# Patient Record
Sex: MALE | Race: WHITE | Employment: OTHER | URBAN - METROPOLITAN AREA
[De-identification: names, ages, dates, MRNs, and addresses within clinical notes are randomized per-mention and may not be internally consistent; named-entity substitution may affect disease eponyms.]

---

## 2017-01-11 ENCOUNTER — OFFICE VISIT (OUTPATIENT)
Dept: HEMATOLOGY | Age: 62
End: 2017-01-11

## 2017-01-11 VITALS
RESPIRATION RATE: 14 BRPM | SYSTOLIC BLOOD PRESSURE: 140 MMHG | TEMPERATURE: 97.6 F | BODY MASS INDEX: 31.33 KG/M2 | HEIGHT: 75 IN | OXYGEN SATURATION: 98 % | HEART RATE: 68 BPM | DIASTOLIC BLOOD PRESSURE: 69 MMHG | WEIGHT: 252 LBS

## 2017-01-11 DIAGNOSIS — K70.30 ALCOHOLIC CIRRHOSIS OF LIVER WITHOUT ASCITES (HCC): Primary | ICD-10-CM

## 2017-01-11 NOTE — PROGRESS NOTES
1. Have you been to the ER, urgent care clinic since your last visit? Hospitalized since your last visit? No    2. Have you seen or consulted any other health care providers outside of the [address] [place] [address] since your last visit? Include any pap smears or colon screening.  No     Chief Complaint   Patient presents with    Follow-up     Fasting

## 2017-01-11 NOTE — PROGRESS NOTES
[address], MD, FACP, [facility]     April Jennifer Hickey, ROSI Rodriguez MD, [address] [facility], Garland Parson MD     [address] [place], AWAIS Lee NP        [address] [place] [address]     [address] [facility], [address] Stephanie Lucas  22.     [phone]     FAX: [address] [place] [address] Drive, [address] [facility], [address]     [phone]     FAX: [phone]       Patient Care Team:  Ermias Booth MD as PCP - General (General Practice)  Ermias Booth MD (General Practice)      Problem List  Date Reviewed: 9/23/2016          Codes Class Noted    Ascites ICD-10-CM: R18.8  ICD-9-CM: 789.59  12/26/2015        S/P TIPS (transjugular intrahepatic portosystemic shunt) ICD-10-CM: M04.685  ICD-9-CM: V45.89  12/26/2015        Cirrhosis, alcoholic ([facility] [id number]: K70.30  ICD-9-CM: 571.2  10/6/2015        Hyponatremia ICD-10-CM: E87.1  ICD-9-CM: 276.1  9/24/2015            Laina Ballesteros returns to the [address] for management of 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 The active problem list, all pertinent past medical history, medications, endoscopic studies, radiologic findings and laboratory findings related to the liver disorder were reviewed with the patient. The patient is a 62 y.o.  male who was first found to have cirrhosis in 9/2015 when developed ascites. Ascites became refractory to diuretics because of hyponatremia. The patient underwent TIPS for treatment of refactory ascites in 12/2015. He has done extremely since placement of the TIPS. Edema has resolved with diuretics. He is currently on furosemide 40 mg only with K-Dur supplements. Patient had formed tender gynecomastia and we had discontinued use of spironolactone at his last office visit.   He states that his symptoms have resolved and he believes that he has had better energy since discontinuation of spironolactone. He questions if he can decrease dose of furosemide as he thinks this may be associated with leg pain/cramping at night. The patient has not developed hepatic encephalopathy post-TIPS. He has had some difficulty with sleep initiation, this is improved with prn use of trazodone. The patient has esophageal varices without bleeding. Varices have been treated with band ligation and now TIPS. The patient has had some recent weight gain in the last 5-6 months and attributes this to inactivity. He is watching diet carefully. He does not believe that he is having fluid retention. The patient completes all daily activities without any functional limitations. The patient has not experienced fatigue, fevers, chills, pain in the right side over the liver, problems concentrating, swelling of the abdomen, swelling of the lower extremities, hematemesis, hematochezia. ALLERGIES  Allergies   Allergen Reactions    Erythromycin Other (comments)     Headache, n/v, diarrhea      Azithromycin Diarrhea and Palpitations    Lipitor [Atorvastatin] Cough    Pravastatin Cough       MEDICATIONS  Current Outpatient Prescriptions   Medication Sig    traZODone (DESYREL) 50 mg tablet TAKE 1 TABLET BY MOUTH EVERY EVENING    KLOR-CON 10 10 mEq tablet TAKE 1 TABLET BY MOUTH ONCE DAILY    metoprolol tartrate (LOPRESSOR) 25 mg tablet Take  by mouth two (2) times a day.  furosemide (LASIX) 40 mg tablet TAKE 1 TABLET BY MOUTH EVERY DAY    losartan (COZAAR) 50 mg tablet     omeprazole (PRILOSEC) 40 mg capsule Take 1 Cap by mouth daily. Indications: GASTRIC ULCER    acetaminophen (TYLENOL) 325 mg tablet Take 325-650 mg by mouth every four (4) hours as needed for Pain.  therapeutic multivitamin (THERAGRAN) tablet Take 1 Tab by mouth daily.  spironolactone (ALDACTONE) 100 mg tablet TAKE 1 TABLET BY MOUTH DAILY    multivitamin (ONE A DAY) tablet Take 1 Tab by mouth daily.  furosemide (LASIX) 40 mg tablet Take 40 mg by mouth daily.  spironolactone (ALDACTONE) 100 mg tablet Take 100 mg by mouth daily.  oxyCODONE IR (ROXICODONE) 5 mg immediate release tablet Take 1 Tab by mouth every four (4) hours as needed for Pain. Max Daily Amount: 30 mg.    oxyCODONE IR (ROXICODONE) 5 mg immediate release tablet Take 1 Tab by mouth every four (4) hours as needed for Pain. Max Daily Amount: 30 mg.    spironolactone (ALDACTONE) 100 mg tablet Take 1 Tab by mouth daily. No current facility-administered medications for this visit. SYSTEM REVIEW NOT RELATED TO LIVER DISEASE OR REVIEWED ABOVE:  Constitution systems: Negative for fever, chills, weight loss. Weight gain of 27# over the course of the last 6 months. Eyes: Negative for visual changes. ENT: Negative for sore throat, painful swallowing. Respiratory: Negative for cough, hemoptysis, SOB. Cardiology: Negative for chest pain, palpitations. GI:  Negative for constipation or diarrhea. : Negative for urinary frequency, dysuria, hematuria, nocturia. Skin: Negative for rash. Well-healed umbilical scar. Hematology: Negative for easy bruising, blood clots. Musculo-skeletal: Negative for back pain, muscle pain, weakness. Neurologic: Negative for headaches, dizziness, vertigo, memory problems not related to HE. Psychology: Negative for anxiety, depression. FAMILY HISTORY:  The father  of head and neck CA. The mother  of brain cancer. There is no family history of liver disease. SOCIAL HISTORY:  The patient is . The patient has 1 child and 1 grandchild. The patient has never used tobacco products. The patient previously consumed 4-6 alcoholic beverages per day. He states that he has had no alcohol since late 2015. The patient used to work as an  for [employer] at [employer]. The patient retired in 2015.      PHYSICAL EXAMINATION:  Visit Vitals    /69 (BP 1 Location: Left arm, BP Patient Position: At rest)    Pulse 68    Temp 97.6 °F (36.4 °C) (Oral)    Resp 14    Ht 6' 3\" (1.905 m)    Wt 252 lb (114.3 kg)    SpO2 98%    BMI 31.5 kg/m2     General: No acute distress. Eyes: Sclera anicteric. ENT: No oral lesions. Thyroid normal.  Nodes: No adenopathy. Skin: No spider angiomata. No jaundice. No palmar erythema. Respiratory: Lungs clear to auscultation. Cardiovascular: Regular heart rate. Grade I/VI holosystolic murmur. No JVD. Some minimal nipple enlargement. Abdomen: Soft non-tender. Liver palpable 2-3 cm BCM. No obvious ascites. Well-healed umbilical hernia scar. Extremities: No edema. No muscle wasting. No gross arthritic changes. Neurologic: Alert and oriented. Cranial nerves grossly intact. No asterixis. LABORATORY STUDIES:  [facility] of [address] [place] [address] & Units 9/23/2016 5/5/2016   WBC 3.4 - 10.8 x10E3/uL 4.9 5.2   ANC 1.8 - 8.0 K/UL     HGB 12.6 - 17.7 g/dL 14.5 12.9    - 379 x10E3/uL 146 (L) 116 (L)   INR 0.8 - 1.2 1.3 (H) 1.3 (H)   AST 0 - 40 IU/L 44 (H) 53 (H)   ALT 0 - 44 IU/L 30 21   Alk Phos 39 - 117 IU/L 128 (H) 103   Bili, Total 0.0 - 1.2 mg/dL 1.2 1.4 (H)   Bili, Direct 0.00 - 0.40 mg/dL 0.40 0.49 (H)   Albumin 3.6 - 4.8 g/dL 3.9 3.8   BUN 8 - 27 mg/dL 9 9   Creat 0.76 - 1.27 mg/dL 0.66 (L) 0.68 (L)   Na 134 - 144 mmol/L 134 132 (L)   K 3.5 - 5.2 mmol/L 4.3 4.6   Cl 97 - 108 mmol/L 96 (L) 94 (L)   CO2 18 - 29 mmol/L 23 25   Glucose 65 - 99 mg/dL 116 (H) 96   Cancer Screening Latest Ref Rng & Units 9/23/2016 5/5/2016   AFP, Serum 0.0 - 8.0 ng/mL 3.3 4.1   AFP-L3% 0.0 - 9.9 % 10.5 (H) 12.2 (H)   Additional lab values drawn at today's office visit are pending at the time of documentation.     SEROLOGIES:  Serologies Latest Ref Rng 9/25/2015   Hep A Ab, Total NEGATIVE   NEGATIVE   Hep B Core Ab, Total NEGATIVE   NEGATIVE   Ferritin 26 - 388 NG/ (H)   Iron % Saturation 20 - 50 % 27   Alpha-1 antitrypsin level 90 - 200 mg/dL 165   9/2015. HBsurface antigen negative, Anti-HBsurface negative, anti-HCV negative. LIVER HISTOLOGY:  Not available or performed    ENDOSCOPIC PROCEDURES:  9/2015. EGD performed by NAE. Medium esophageal varices. Banding performed. Mild portal gastropathy. RADIOLOGY:  9/2015. Ultrasound of liver. Echogenic consistent with cirrhosis. No liver mass lesions. No dilated bile ducts. Severe ascites. 1/2016. CT scan abdomen with and without IV contrast.  Changes consistent with cirrhosis. No liver mass lesions. No dilated bile ducts. Small ascites. 5/2016. Ultrasound of liver. Echogenic consistent with cirrhosis. No liver mass lesions. No dilated bile ducts. No ascites. TIPS patent. 11/2016. Ultrasound of liver. Patent TIPS. No significant ascites. Heterogeneous liver. OTHER TESTING:  Not available or performed    ASSESSMENT AND PLAN:  Cirrhosis secondary to alcohol. Recent labs show stable, depressed liver function. His last Na MELD was calculated at 13, this will now be repeated. As patient is now >12 months sober, consideration for liver transplantation could be entertained if indicated. On the basis of the last labs in 9/2016, patient had shown some marked improvement in labs and we had chosen to defer further consideration at that time. I will recalculate his MELD and discuss this further with the patient. He would need to establish with counselor or alcohol rehabilitation program in order to pursue liver transplant evaluation. Gynecomastia. Improved with discontinuation of spironolactone. Will continue with furosemide only at this time, I have advised patient ok to decrease dose of furosemide to 20 mg daily as he think that some of his leg cramping is associated with diuretic use. He has had significant weight gain, although on exam, has no LE edema and no perceived ascites.       Ascites has now resolved following placement of TIPS and with diuretic. Will continue with decreased dose of diuretics, to furosemide 20 mg only. Will continue to monitor sodium levels as hyponatremia has been a long-standing issue. Recent weight gain. Patient will work of diet and exercise. I will continue to monitor for patency of TIPS with US evaluation and this has been ordered for return office visit in 5/2017. Lower extremity edema has resolved with current dose of diuretics. Esophageal varices without prior bleeding. Varices have been banded. TIPS will treat varcies he will not require additional EGD with banding. Follow-up per Dr. Denzel Nathan. Hepatic encephalopathy has not developed to date. There is no need for treatment with lactulose and/or Xifaxan at this time. No need to restrict dietary protein at this time. Insomnia. Patient notes improvement in symptoms with the addition of trazadone, will continue to use prn. The patient was directed to continue all current medications at the current dosages. There are no contraindications for the patient to take any medications that are necessary for treatment of other medical issues. The patient was counseled regarding alcohol consumption. I have congratulated him on attaining >18 months sobriety. Vaccination for viral hepatitis A and B is recommended since the patient has no serologic evidence of previous exposure or vaccination with immunity. [address] screening has recently been performed and does not suggest [address] by recent CT scan. The next liver imaging study will be performed in 5/2017 for US with doppler study. All of the above issues were discussed with the patient. All questions were answered. The patient expressed a clear understanding of the above. [facility] in 3-4 months, with duplex US of the liver at that time.     Jose French PA-C  Liver [place] of [address] [facility] [address] [address]  [phone]

## 2017-01-12 LAB
AFP L3 MFR SERPL: NORMAL % (ref 0–9.9)
AFP SERPL-MCNC: 2.4 NG/ML (ref 0–8)
ALBUMIN SERPL-MCNC: 4 G/DL (ref 3.6–4.8)
ALP SERPL-CCNC: 112 IU/L (ref 39–117)
ALT SERPL-CCNC: 33 IU/L (ref 0–44)
AST SERPL-CCNC: 42 IU/L (ref 0–40)
BILIRUB DIRECT SERPL-MCNC: 0.39 MG/DL (ref 0–0.4)
BILIRUB SERPL-MCNC: 1 MG/DL (ref 0–1.2)
BUN SERPL-MCNC: 7 MG/DL (ref 8–27)
BUN/CREAT SERPL: 11 (ref 10–22)
CALCIUM SERPL-MCNC: 9.1 MG/DL (ref 8.6–10.2)
CHLORIDE SERPL-SCNC: 96 MMOL/L (ref 96–106)
CO2 SERPL-SCNC: 22 MMOL/L (ref 18–29)
CREAT SERPL-MCNC: 0.66 MG/DL (ref 0.76–1.27)
ERYTHROCYTE [DISTWIDTH] IN BLOOD BY AUTOMATED COUNT: 13.2 % (ref 12.3–15.4)
GLUCOSE SERPL-MCNC: 100 MG/DL (ref 65–99)
HCT VFR BLD AUTO: 41.7 % (ref 37.5–51)
HGB BLD-MCNC: 15.4 G/DL (ref 12.6–17.7)
INR PPP: 1.3 (ref 0.8–1.2)
MCH RBC QN AUTO: 33.8 PG (ref 26.6–33)
MCHC RBC AUTO-ENTMCNC: 36.9 G/DL (ref 31.5–35.7)
MCV RBC AUTO: 91 FL (ref 79–97)
MORPHOLOGY BLD-IMP: ABNORMAL
PLATELET # BLD AUTO: 156 X10E3/UL (ref 150–379)
POTASSIUM SERPL-SCNC: 4.8 MMOL/L (ref 3.5–5.2)
PROTHROMBIN TIME: 12.9 SEC (ref 9.1–12)
RBC # BLD AUTO: 4.56 X10E6/UL (ref 4.14–5.8)
SODIUM SERPL-SCNC: 132 MMOL/L (ref 134–144)
WBC # BLD AUTO: 5.6 X10E3/UL (ref 3.4–10.8)

## 2017-04-24 RX ORDER — TRAZODONE HYDROCHLORIDE 50 MG/1
TABLET ORAL
Qty: 30 TAB | Refills: 3 | Status: SHIPPED | OUTPATIENT
Start: 2017-04-24 | End: 2018-03-08

## 2017-05-08 ENCOUNTER — HOSPITAL ENCOUNTER (OUTPATIENT)
Dept: ULTRASOUND IMAGING | Age: 62
Discharge: HOME | End: 2017-05-08
Attending: PHYSICIAN ASSISTANT
Payer: COMMERCIAL

## 2017-05-08 DIAGNOSIS — K70.30 ALCOHOLIC CIRRHOSIS OF LIVER WITHOUT ASCITES (HCC): ICD-10-CM

## 2017-05-08 PROCEDURE — 93975 VASCULAR STUDY: CPT

## 2017-05-11 ENCOUNTER — OFFICE VISIT (OUTPATIENT)
Dept: HEMATOLOGY | Age: 62
End: 2017-05-11

## 2017-05-11 VITALS
DIASTOLIC BLOOD PRESSURE: 95 MMHG | SYSTOLIC BLOOD PRESSURE: 167 MMHG | OXYGEN SATURATION: 96 % | BODY MASS INDEX: 31.27 KG/M2 | TEMPERATURE: 98.9 F | HEART RATE: 66 BPM | WEIGHT: 250.2 LBS

## 2017-05-11 DIAGNOSIS — K70.30 ALCOHOLIC CIRRHOSIS OF LIVER WITHOUT ASCITES (HCC): Primary | ICD-10-CM

## 2017-05-11 NOTE — PROGRESS NOTES
[address] [facility], MD, FACP, [facility]     April Jenni Suárez, ROSI Grant MD, [address], Bertha Matt MD     [address] [place], NP     Xochitl Camacho, AWAIS [facility]     [address] [facility], [address] Stephanie Lucas  22.     [phone]     FAX: [address] [place] [address] Drive, [address] [facility], [address]     [phone]     FAX: [phone]       Patient Care Team:  Claribel Salazar MD as PCP - General (General Practice)  Claribel Salazar MD (General Practice)      Problem List  Date Reviewed: 1/11/2017          Codes Class Noted    Ascites ICD-10-CM: R18.8  ICD-9-CM: 789.59  12/26/2015        S/P TIPS (transjugular intrahepatic portosystemic shunt) ICD-10-CM: Q19.632  ICD-9-CM: V45.89  12/26/2015        Cirrhosis, alcoholic ([facility] [id number]: K70.30  ICD-9-CM: 571.2  10/6/2015        Hyponatremia ICD-10-CM: E87.1  ICD-9-CM: 276.1  9/24/2015                 Kamryn Le returns to the [place] [address] of [address] [place] [address] for management of 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 The active problem list, all pertinent past medical history, medications, endoscopic studies, radiologic findings and laboratory findings related to the liver disorder were reviewed with the patient. The patient is a 65 y.o.  male who was first found to have cirrhosis in 9/2015 when developed ascites. Ascites became refractory to diuretics because of hyponatremia. The patient underwent TIPS for treatment of refactory ascites in 12/2015. He has done extremely since placement of the TIPS. Edema has resolved with diuretics. Patient has done so well with resolution of edema that he discontinued use of all diuretics as of ~3 weeks ago. He has noted no increase in fluid and has had an improvement in muscle cramping symptoms and subsequent improved sleeping patterns.   He continues with with prn use of trazodone. The patient has esophageal varices without bleeding. Varices have been treated with band ligation and now TIPS. The patient has had some recent weight gain in the last 5-6 months and attributes this to inactivity. He is watching diet carefully. He does not believe that he is having fluid retention. The patient completes all daily activities without any functional limitations. The patient has not experienced fatigue, fevers, chills, pain in the right side over the liver, problems concentrating, swelling of the abdomen, swelling of the lower extremities, hematemesis, hematochezia. ALLERGIES  Allergies   Allergen Reactions    Erythromycin Other (comments)     Headache, n/v, diarrhea      Azithromycin Diarrhea and Palpitations    Lipitor [Atorvastatin] Cough    Pravastatin Cough       MEDICATIONS  Current Outpatient Prescriptions   Medication Sig    traZODone (DESYREL) 50 mg tablet TAKE 1 TABLET BY MOUTH EVERY EVENING    KLOR-CON 10 10 mEq tablet TAKE 1 TABLET BY MOUTH ONCE DAILY    metoprolol tartrate (LOPRESSOR) 25 mg tablet Take  by mouth two (2) times a day.  furosemide (LASIX) 40 mg tablet TAKE 1 TABLET BY MOUTH EVERY DAY    losartan (COZAAR) 50 mg tablet     omeprazole (PRILOSEC) 40 mg capsule Take 1 Cap by mouth daily. Indications: GASTRIC ULCER    multivitamin (ONE A DAY) tablet Take 1 Tab by mouth daily.  furosemide (LASIX) 40 mg tablet Take 40 mg by mouth daily.  oxyCODONE IR (ROXICODONE) 5 mg immediate release tablet Take 1 Tab by mouth every four (4) hours as needed for Pain. Max Daily Amount: 30 mg.    acetaminophen (TYLENOL) 325 mg tablet Take 325-650 mg by mouth every four (4) hours as needed for Pain.  therapeutic multivitamin (THERAGRAN) tablet Take 1 Tab by mouth daily. No current facility-administered medications for this visit.         SYSTEM REVIEW NOT RELATED TO LIVER DISEASE OR REVIEWED ABOVE:  Constitution systems: Negative for fever, chills, weight loss. Weight gain of 27# over the course of the last 6 months, stable. Eyes: Negative for visual changes. ENT: Negative for sore throat, painful swallowing. Respiratory: Negative for cough, hemoptysis, SOB. Cardiology: Negative for chest pain, palpitations. GI:  Negative for constipation or diarrhea. : Negative for urinary frequency, dysuria, hematuria, nocturia. Skin: Negative for rash. Well-healed umbilical scar. Hematology: Negative for easy bruising, blood clots. Musculo-skeletal: Negative for back pain, muscle pain, weakness. Neurologic: Negative for headaches, dizziness, vertigo, memory problems not related to HE. Psychology: Negative for anxiety, depression. FAMILY HISTORY:  The father  of head and neck CA. The mother  of brain cancer. There is no family history of liver disease. SOCIAL HISTORY:  The patient is . The patient has 1 child and 1 grandchild. The patient has never used tobacco products. The patient previously consumed 4-6 alcoholic beverages per day. He states that he has had no alcohol since late 2015. The patient used to work as an  for [employer] at [employer]. The patient retired in 2015. PHYSICAL EXAMINATION:  Visit Vitals    BP (!) 167/95    Pulse 66    Temp 98.9 °F (37.2 °C) (Tympanic)    Wt 250 lb 3.2 oz (113.5 kg)    SpO2 96%    BMI 31.27 kg/m2     General: No acute distress. Eyes: Sclera anicteric. ENT: No oral lesions. Thyroid normal.  Nodes: No adenopathy. Skin: No spider angiomata. No jaundice. No palmar erythema. Respiratory: Lungs clear to auscultation. Cardiovascular: Regular heart rate. Grade I/VI holosystolic murmur. No JVD. Some minimal nipple enlargement. Abdomen: Soft non-tender. Liver palpable 2-3 cm BCM. No obvious ascites. Well-healed umbilical hernia scar. Extremities: No edema. No muscle wasting.   No gross arthritic changes. Neurologic: Alert and oriented. Cranial nerves grossly intact. No asterixis. LABORATORY STUDIES:  Liver [place] of [address] & Units 1/11/2017 9/23/2016 5/5/2016   WBC 3.4 - 10.8 x10E3/uL 5.6 4.9 5.2   ANC 1.8 - 8.0 K/UL      HGB 12.6 - 17.7 g/dL 15.4 14.5 12.9    - 379 x10E3/uL 156 146 (L) 116 (L)   INR 0.8 - 1.2 1.3 (H) 1.3 (H) 1.3 (H)   AST 0 - 40 IU/L 42 (H) 44 (H) 53 (H)   ALT 0 - 44 IU/L 33 30 21   Alk Phos 39 - 117 IU/L 112 128 (H) 103   Bili, Total 0.0 - 1.2 mg/dL 1.0 1.2 1.4 (H)   Bili, Direct 0.00 - 0.40 mg/dL 0.39 0.40 0.49 (H)   Albumin 3.6 - 4.8 g/dL 4.0 3.9 3.8   BUN 8 - 27 mg/dL 7 (L) 9 9   Creat 0.76 - 1.27 mg/dL 0.66 (L) 0.66 (L) 0.68 (L)   Na 134 - 144 mmol/L 132 (L) 134 132 (L)   K 3.5 - 5.2 mmol/L 4.8 4.3 4.6   Cl 96 - 106 mmol/L 96 96 (L) 94 (L)   CO2 18 - 29 mmol/L 22 23 25   Glucose 65 - 99 mg/dL 100 (H) 116 (H) 96     Cancer Screening Latest Ref Rng & Units 1/11/2017 9/23/2016 5/5/2016   AFP, Serum 0.0 - 8.0 ng/mL 2.4 3.3 4.1   AFP-L3% 0.0 - 9.9 % Comment 10.5 (H) 12.2 (H)   Additional lab values drawn at today's office visit are pending at the time of documentation. SEROLOGIES:  Serologies Latest Ref Rng 9/25/2015   Hep A Ab, Total NEGATIVE   NEGATIVE   Hep B Core Ab, Total NEGATIVE   NEGATIVE   Ferritin 26 - 388 NG/ (H)   Iron % Saturation 20 - 50 % 27   Alpha-1 antitrypsin level 90 - 200 mg/dL 165   9/2015. HBsurface antigen negative, Anti-HBsurface negative, anti-HCV negative. LIVER HISTOLOGY:  Not available or performed    ENDOSCOPIC PROCEDURES:  9/2015. EGD performed by MLS. Medium esophageal varices. Banding performed. Mild portal gastropathy. RADIOLOGY:  9/2015. Ultrasound of liver. Echogenic consistent with cirrhosis. No liver mass lesions. No dilated bile ducts. Severe ascites. 1/2016. CT scan abdomen with and without IV contrast.  Changes consistent with cirrhosis. No liver mass lesions. No dilated bile ducts. Small ascites. 5/2016. Ultrasound of liver. Echogenic consistent with cirrhosis. No liver mass lesions. No dilated bile ducts. No ascites. TIPS patent. 11/2016. Ultrasound of liver. Patent TIPS. No significant ascites. Heterogeneous liver.  5/2017. Ultrasound of liver. Echogenic consistent with cirrhosis. Patent TIPS, no liver mass lesions. No dilated bile ducts. No ascites. OTHER TESTING:  Not available or performed    ASSESSMENT AND PLAN:  Cirrhosis secondary to alcohol. Recent labs show stable liver function dramatically improved since cessation of alcohol. He feels that he is back to his baseline status for strength and stamina. His last Na MELD was calculated at 11, this will now be repeated. As patient is now >12 months sober, consideration for liver transplantation could be entertained if indicated. On the basis of the last labs in 1/2017, patient had shown some marked improvement and there is no indication for transplant consideration at this time. Ascites and edema have now resolved following placement of TIPS. Recent assessment of TIPS is patent. Patient has discontinued use of diuretics as of ~3 weeks ago and has had no reaccumulation. I will continue to monitor. Lower extremity edema has resolved. Esophageal varices without prior bleeding. Varices have been banded. TIPS will treat varcies he will not require additional EGD with banding. Follow-up per Dr. Jude Jacinto. Hepatic encephalopathy has not developed to date. There is no need for treatment with lactulose and/or Xifaxan at this time. No need to restrict dietary protein at this time. Insomnia. Patient notes improvement in symptoms with the addition of trazadone, will continue to use prn. The patient was directed to continue all current medications at the current dosages. There are no contraindications for the patient to take any medications that are necessary for treatment of other medical issues. The patient was counseled regarding alcohol consumption. I have congratulated him on attaining >18 months sobriety. Vaccination for viral hepatitis A and B is recommended since the patient has no serologic evidence of previous exposure or vaccination with immunity. [address] screening has recently been performed and does not suggest [address] The next liver imaging study will be performed in 11/2017 for US with doppler study. All of the above issues were discussed with the patient. All questions were answered. The patient expressed a clear understanding of the above. Alec in 3-4 months.     Lorraine Fontanez PA-C  Liver [place] of [address] [place] [address], [address] [address] [place]  22. [phone]

## 2017-05-11 NOTE — MR AVS SNAPSHOT
Visit Information Date & Time Provider Department Dept. Phone Encounter #  
 5/11/2017 11:00 AM [facility] [address] [id number] Follow-up Instructions Return in about 3 months (around 8/11/2017) for Jenny and Margaret [address] Upcoming Health Maintenance Date Due DTaP/Tdap/Td series (1 - Tdap) 2/16/1977 ZOSTER VACCINE AGE 60> 2/16/2016 FOBT Q 1 YEAR AGE 50-75 1/19/2017 INFLUENZA AGE 9 TO ADULT 8/1/2017 Allergies as of 5/11/2017  Review Complete On: 5/11/2017 By: Ilia Davis Severity Noted Reaction Type Reactions Erythromycin High 09/24/2015   Side Effect Other (comments) Headache, n/v, diarrhea Azithromycin  01/19/2016    Diarrhea, Palpitations Lipitor [Atorvastatin]  09/24/2015    Cough Pravastatin  09/24/2015    Cough Current Immunizations  Reviewed on 1/20/2016 Name Date Hep A Vaccine 10/8/2015 Hep B Vaccine 11/8/2015 Influenza Vaccine (Quad) PF 9/28/2015  1:14 PM  
 Pneumococcal Polysaccharide (PPSV-23) 9/28/2015  1:15 PM  
  
 Not reviewed this visit You Were Diagnosed With   
  
 Codes Comments Alcoholic cirrhosis of liver without ascites ([facility].)    -  Primary ICD-10-CM: K70.30 ICD-9-CM: 571.2 Vitals BP Pulse Temp Weight(growth percentile) SpO2 BMI  
 (!) 167/95 66 98.9 °F (37.2 °C) (Tympanic) 250 lb 3.2 oz (113.5 kg) 96% 31.27 kg/m2 Smoking Status Never Smoker BMI and BSA Data Body Mass Index Body Surface Area  
 31.27 kg/m 2 2.45 m 2 Preferred Pharmacy Pharmacy Name Phone [id number] [place], [place] - [address] AT [facility] [phone] Your Updated Medication List  
  
   
This list is accurate as of: 5/11/17 11:07 AM.  Always use your most recent med list.  
  
  
  
  
 acetaminophen 325 mg tablet Commonly known as:  TYLENOL  
 Take 325-650 mg by mouth every four (4) hours as needed for Pain. furosemide 40 mg tablet Commonly known as:  LASIX TAKE 1 TABLET BY MOUTH EVERY DAY  
  
 KLOR-CON 10 10 mEq tablet Generic drug:  potassium chloride SR  
TAKE 1 TABLET BY MOUTH ONCE DAILY losartan 50 mg tablet Commonly known as:  COZAAR Take 50 mg by mouth daily. metoprolol tartrate 25 mg tablet Commonly known as:  LOPRESSOR Take  by mouth two (2) times a day. multivitamin tablet Commonly known as:  ONE A DAY Take 1 Tab by mouth daily. omeprazole 40 mg capsule Commonly known as:  PRILOSEC Take 1 Cap by mouth daily. Indications: GASTRIC ULCER  
  
 therapeutic multivitamin tablet Commonly known as:  [facility] Take 1 Tab by mouth daily. traZODone 50 mg tablet Commonly known as:  DESYREL  
TAKE 1 TABLET BY MOUTH EVERY EVENING We Performed the Following AFP WITH AFP-L3% [[id number] Custom] CBC W/O DIFF [74930 CPT(R)] HEPATIC FUNCTION PANEL (6) [[id number] Custom] METABOLIC PANEL, BASIC [32095 CPT(R)] PROTHROMBIN TIME + INR [63898 CPT(R)] Follow-up Instructions Return in about 3 months (around 8/11/2017) for Jenny and [address] Patient Instructions FIBROSCAN PATIENT INFORMATION What is Fibroscan:? 
 
Fibroscan is an ultrasound device that measures liver stiffness by sending a pulse of vibrations through the liver. This translated into an immediate result that can help your healthcare team determine the level of damage to the liver as well as monitor the condition of various liver diseases over time. Fibroscan is helpful in the evaluation of the following conditions: 
 
Chronic Hepatitis C Chronic Hepatitis B Fatty Liver Disease Alcohol Liver Disease Chronic Cholestatic Liver Diseases What happens During the Scan?  
 
Patients receiving this exam lie flat on an examination table and raise the right arm above the head. The skin over the right lower rib cage is exposed and the examiner locates the correct area to be scanned. The prove of the scanner is placed directly on the patient and triggered to start. This fells like a gentle flick against the skin and should not be uncomfortable. At least ten (10) readings are taken and the average is calculated to score the amount of liver stiffness or scar tissue. The exam should take 10-20 minutes. What do I need to do to prepare for the scan? Please do not eat or drink anything 2-4 hours  Before your Fibroscan. You should continue taking any prescribed medication and can take small sips of water or clear fluid to do so,  But avoid drinking large amounts of fluid. Please dress comfortably in clothes that will allow for easy access to the right side of the abdomen. Women are discouraged from wearing a dress on the day of the exam. 
 
Are there any special precautions? Patients who are pregnant or have an implantable device (for example, pacemaker or defibrillator) should not have this exam performed. Patients with a significant amount of fat tissue in the area the probe is pressed may be unable to have test performed. Introducing [facility] & [facility] SERVICES! Dear Danis Marley: Thank you for requesting a [employer] account. Our records indicate that you already have an active [employer] account. You can access your account anytime at [email or web address] Did you know that you can access your hospital and ER discharge instructions at any time in [employer]? You can also review all of your test results from your hospital stay or ER visit. Additional Information If you have questions, please visit the Frequently Asked Questions section of the [email or web address] website at [email or web address] Remember, [email or web address] is NOT to be used for urgent needs. For medical emergencies, dial 911. Now available from your iPhone and Android! Please provide this summary of care documentation to your next provider. Your primary care clinician is listed as Edin Luz. If you have any questions after today's visit, please call [phone].

## 2017-05-11 NOTE — PATIENT INSTRUCTIONS
FIBROSCAN PATIENT INFORMATION    What is Fibroscan:?    Fibroscan is an ultrasound device that measures liver stiffness by sending a pulse of vibrations through the liver. This translated into an immediate result that can help your healthcare team determine the level of damage to the liver as well as monitor the condition of various liver diseases over time. Fibroscan is helpful in the evaluation of the following conditions:    Chronic Hepatitis C  Chronic Hepatitis B  Fatty Liver Disease  Alcohol Liver Disease  Chronic Cholestatic Liver Diseases    What happens During the Scan? Patients receiving this exam lie flat on an examination table and raise the right arm above the head. The skin over the right lower rib cage is exposed and the examiner locates the correct area to be scanned. The prove of the scanner is placed directly on the patient and triggered to start. This fells like a gentle flick against the skin and should not be uncomfortable. At least ten (10) readings are taken and the average is calculated to score the amount of liver stiffness or scar tissue. The exam should take 10-20 minutes. What do I need to do to prepare for the scan? Please do not eat or drink anything 2-4 hours  Before your Fibroscan. You should continue taking any prescribed medication and can take small sips of water or clear fluid to do so,  But avoid drinking large amounts of fluid. Please dress comfortably in clothes that will allow for easy access to the right side of the abdomen. Women are discouraged from wearing a dress on the day of the exam.    Are there any special precautions? Patients who are pregnant or have an implantable device (for example, pacemaker or defibrillator) should not have this exam performed. Patients with a significant amount of fat tissue in the area the probe is pressed may be unable to have test performed.

## 2017-05-12 LAB
AFP L3 MFR SERPL: 10.6 % (ref 0–9.9)
AFP SERPL-MCNC: 2.9 NG/ML (ref 0–8)
ALBUMIN SERPL-MCNC: 4.1 G/DL (ref 3.6–4.8)
ALP SERPL-CCNC: 97 IU/L (ref 39–117)
ALT SERPL-CCNC: 31 IU/L (ref 0–44)
AST SERPL-CCNC: 51 IU/L (ref 0–40)
BILIRUB DIRECT SERPL-MCNC: 0.4 MG/DL (ref 0–0.4)
BILIRUB SERPL-MCNC: 1.1 MG/DL (ref 0–1.2)
BUN SERPL-MCNC: 6 MG/DL (ref 8–27)
BUN/CREAT SERPL: 9 (ref 10–24)
CALCIUM SERPL-MCNC: 8.9 MG/DL (ref 8.6–10.2)
CHLORIDE SERPL-SCNC: 95 MMOL/L (ref 96–106)
CO2 SERPL-SCNC: 22 MMOL/L (ref 18–29)
CREAT SERPL-MCNC: 0.65 MG/DL (ref 0.76–1.27)
ERYTHROCYTE [DISTWIDTH] IN BLOOD BY AUTOMATED COUNT: 13.4 % (ref 12.3–15.4)
GLUCOSE SERPL-MCNC: 101 MG/DL (ref 65–99)
HCT VFR BLD AUTO: 43.4 % (ref 37.5–51)
HGB BLD-MCNC: 14.9 G/DL (ref 12.6–17.7)
INR PPP: 1.3 (ref 0.8–1.2)
MCH RBC QN AUTO: 32.2 PG (ref 26.6–33)
MCHC RBC AUTO-ENTMCNC: 34.3 G/DL (ref 31.5–35.7)
MCV RBC AUTO: 94 FL (ref 79–97)
PLATELET # BLD AUTO: 162 X10E3/UL (ref 150–379)
POTASSIUM SERPL-SCNC: 4.1 MMOL/L (ref 3.5–5.2)
PROTHROMBIN TIME: 13.2 SEC (ref 9.1–12)
RBC # BLD AUTO: 4.63 X10E6/UL (ref 4.14–5.8)
SODIUM SERPL-SCNC: 132 MMOL/L (ref 134–144)
WBC # BLD AUTO: 5.9 X10E3/UL (ref 3.4–10.8)

## 2017-06-09 ENCOUNTER — OFFICE VISIT (OUTPATIENT)
Dept: HEMATOLOGY | Age: 62
End: 2017-06-09

## 2017-06-09 VITALS
SYSTOLIC BLOOD PRESSURE: 151 MMHG | OXYGEN SATURATION: 97 % | DIASTOLIC BLOOD PRESSURE: 81 MMHG | WEIGHT: 250.8 LBS | HEART RATE: 62 BPM | TEMPERATURE: 97.8 F | BODY MASS INDEX: 31.35 KG/M2

## 2017-06-09 DIAGNOSIS — K70.30 ALCOHOLIC CIRRHOSIS OF LIVER WITHOUT ASCITES (HCC): Primary | ICD-10-CM

## 2017-06-09 NOTE — MR AVS SNAPSHOT
Visit Information Date & Time Provider Department Dept. Phone Encounter #  
 6/9/2017  1:00 PM Timur [facility] [phone] [id number] Follow-up Instructions Return in about 5 months (around 11/9/2017). Your Appointments 11/8/2017 11:30 AM  
Follow Up with NANCY [facility] ([facility]) Appt Note: Follow up [address] [facility] [phone] [facility] [address]  
[address] [facility] [place] [address] Upcoming Health Maintenance Date Due DTaP/Tdap/Td series (1 - Tdap) 2/16/1977 ZOSTER VACCINE AGE 60> 2/16/2016 FOBT Q 1 YEAR AGE 50-75 1/19/2017 INFLUENZA AGE 9 TO ADULT 8/1/2017 Allergies as of 6/9/2017  Review Complete On: 6/9/2017 By: Luisito Purvis Severity Noted Reaction Type Reactions Erythromycin High 09/24/2015   Side Effect Other (comments) Headache, n/v, diarrhea Azithromycin  01/19/2016    Diarrhea, Palpitations Lipitor [Atorvastatin]  09/24/2015    Cough Pravastatin  09/24/2015    Cough Current Immunizations  Reviewed on 1/20/2016 Name Date Hep A Vaccine 10/8/2015 Hep B Vaccine 11/8/2015 Influenza Vaccine (Quad) PF 9/28/2015  1:14 PM  
 Pneumococcal Polysaccharide (PPSV-23) 9/28/2015  1:15 PM  
  
 Not reviewed this visit You Were Diagnosed With   
  
 Codes Comments Alcoholic cirrhosis of liver without ascites ([facility].)    -  Primary ICD-10-CM: K70.30 ICD-9-CM: 571.2 Vitals BP Pulse Temp Weight(growth percentile) SpO2 BMI  
 151/81 62 97.8 °F (36.6 °C) (Oral) 250 lb 12.8 oz (113.8 kg) 97% 31.35 kg/m2 Smoking Status Never Smoker BMI and BSA Data Body Mass Index Body Surface Area  
 31.35 kg/m 2 2.45 m 2 Preferred Pharmacy Pharmacy Name Phone  [id number] [place], [place] - [address] AT [facility] [address] [facility] [phone] Your Updated Medication List  
  
   
This list is accurate as of: 6/9/17  1:29 PM.  Always use your most recent med list.  
  
  
  
  
 acetaminophen 325 mg tablet Commonly known as:  TYLENOL Take 325-650 mg by mouth every four (4) hours as needed for Pain. furosemide 40 mg tablet Commonly known as:  LASIX TAKE 1 TABLET BY MOUTH EVERY DAY  
  
 KLOR-CON 10 10 mEq tablet Generic drug:  potassium chloride SR  
TAKE 1 TABLET BY MOUTH ONCE DAILY losartan 50 mg tablet Commonly known as:  COZAAR Take 50 mg by mouth daily. metoprolol tartrate 25 mg tablet Commonly known as:  LOPRESSOR Take  by mouth two (2) times a day. multivitamin tablet Commonly known as:  ONE A DAY Take 1 Tab by mouth daily. omeprazole 40 mg capsule Commonly known as:  PRILOSEC Take 1 Cap by mouth daily. Indications: GASTRIC ULCER  
  
 therapeutic multivitamin tablet Commonly known as:  [facility] Take 1 Tab by mouth daily. traZODone 50 mg tablet Commonly known as:  DESYREL  
TAKE 1 TABLET BY MOUTH EVERY EVENING Follow-up Instructions Return in about 5 months (around 11/9/2017). To-Do List   
 11/09/2017 Imaging:  DUPLEX ABD VISC ART/MICHAEL/ORGANS COMPLETE Introducing [facility] SERVICES! Dear Severa Ingle: Thank you for requesting a [email or web address] account. Our records indicate that you already have an active [email or web address] account. You can access your account anytime at [email or web address] Did you know that you can access your hospital and ER discharge instructions at any time in [email or web address]? You can also review all of your test results from your hospital stay or ER visit. Additional Information If you have questions, please visit the Frequently Asked Questions section of the [email or web address] website at [email or web address] Remember, [email or web address] is NOT to be used for urgent needs.  For medical emergencies, dial 911. Now available from your iPhone and Android! Please provide this summary of care documentation to your next provider. Your primary care clinician is listed as Thong Like. If you have any questions after today's visit, please call [phone].

## 2017-06-09 NOTE — PROGRESS NOTES
[address], MD, FACP, [facility]     April Ryder Ruffin, ROSI Rivera MD, Luna Wood, Romana Confer, MD     [facility] [place], NP     David Cano, NP        [facility]     [address] [facility], [address] [facility]     [address] [facility].     [phone]     FAX: [address] [place] [address] Drive, [address] [facility], [address]     [phone]     FAX: [phone]       Patient Care Team:  Eder Chavarria MD as PCP - General (General Practice)  Eder Chavarria MD (General Practice)      Problem List  Date Reviewed: 5/11/2017          Codes Class Noted    Ascites ICD-10-CM: R18.8  ICD-9-CM: 789.59  12/26/2015        S/P TIPS (transjugular intrahepatic portosystemic shunt) ICD-10-CM: S58.345  ICD-9-CM: V45.89  12/26/2015        Cirrhosis, alcoholic ([facility] DME-13-QX: K70.30  ICD-9-CM: 571.2  10/6/2015        Hyponatremia ICD-10-CM: E87.1  ICD-9-CM: 276.1  9/24/2015                 Iliana Kidd returns to the [address] for management of 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 We have him coming back to the office for assessment of fibrosis with an in-office fibroscan. The active problem list, all pertinent past medical history, medications, endoscopic studies, radiologic findings and laboratory findings related to the liver disorder were reviewed with the patient. The patient is a 65 y.o.  male who was first found to have cirrhosis in 9/2015 when developed ascites. Ascites became refractory to diuretics because of hyponatremia. The patient underwent TIPS for treatment of refactory ascites in 12/2015. He has done extremely since placement of the TIPS. Edema has resolved with diuretics. Patient has done so well with resolution of edema that he discontinued use of all diuretics as of ~2 months ago.   He has noted no increase in fluid and has had an improvement in muscle cramping symptoms and subsequent improved sleeping patterns. He continues with with prn use of trazodone. The patient has esophageal varices without bleeding. Varices have been treated with band ligation and now TIPS. The patient has had some recent weight gain in the last 5-6 months and attributes this to inactivity. He is watching diet carefully. He does not believe that he is having fluid retention. The patient completes all daily activities without any functional limitations. The patient has not experienced fatigue, fevers, chills, pain in the right side over the liver, problems concentrating, swelling of the abdomen, swelling of the lower extremities, hematemesis, hematochezia. ALLERGIES  Allergies   Allergen Reactions    Erythromycin Other (comments)     Headache, n/v, diarrhea      Azithromycin Diarrhea and Palpitations    Lipitor [Atorvastatin] Cough    Pravastatin Cough       MEDICATIONS  Current Outpatient Prescriptions   Medication Sig    traZODone (DESYREL) 50 mg tablet TAKE 1 TABLET BY MOUTH EVERY EVENING    metoprolol tartrate (LOPRESSOR) 25 mg tablet Take  by mouth two (2) times a day.  losartan (COZAAR) 50 mg tablet Take 50 mg by mouth daily.  multivitamin (ONE A DAY) tablet Take 1 Tab by mouth daily.  acetaminophen (TYLENOL) 325 mg tablet Take 325-650 mg by mouth every four (4) hours as needed for Pain.  KLOR-CON 10 10 mEq tablet TAKE 1 TABLET BY MOUTH ONCE DAILY    furosemide (LASIX) 40 mg tablet TAKE 1 TABLET BY MOUTH EVERY DAY    omeprazole (PRILOSEC) 40 mg capsule Take 1 Cap by mouth daily. Indications: GASTRIC ULCER    therapeutic multivitamin (THERAGRAN) tablet Take 1 Tab by mouth daily. No current facility-administered medications for this visit. SYSTEM REVIEW NOT RELATED TO LIVER DISEASE OR REVIEWED ABOVE:  Constitution systems: Negative for fever, chills, weight loss.   Weight gain of 27# over the course of the last 6 months, stable. Eyes: Negative for visual changes. ENT: Negative for sore throat, painful swallowing. Respiratory: Negative for cough, hemoptysis, SOB. Cardiology: Negative for chest pain, palpitations. GI:  Negative for constipation or diarrhea. : Negative for urinary frequency, dysuria, hematuria, nocturia. Skin: Negative for rash. Well-healed umbilical scar. Hematology: Negative for easy bruising, blood clots. Musculo-skeletal: Negative for back pain, muscle pain, weakness. Neurologic: Negative for headaches, dizziness, vertigo, memory problems not related to HE. Psychology: Negative for anxiety, depression. FAMILY HISTORY:  The father  of head and neck CA. The mother  of brain cancer. There is no family history of liver disease. SOCIAL HISTORY:  The patient is . The patient has 1 child and 1 grandchild. The patient has never used tobacco products. The patient previously consumed 4-6 alcoholic beverages per day. He states that he has had no alcohol since late 2015. The patient used to work as an  for [employer] at [employer]. The patient retired in 2015. PHYSICAL EXAMINATION:  Visit Vitals    /81    Pulse 62    Temp 97.8 °F (36.6 °C) (Oral)    Wt 250 lb 12.8 oz (113.8 kg)    SpO2 97%    BMI 31.35 kg/m2     General: No acute distress. Eyes: Sclera anicteric. ENT: No oral lesions. Thyroid normal.  Nodes: No adenopathy. Skin: No spider angiomata. No jaundice. No palmar erythema. Respiratory: Lungs clear to auscultation. Cardiovascular: Regular heart rate. Grade I/VI holosystolic murmur. No JVD. Some minimal nipple enlargement. Abdomen: Soft non-tender. Liver palpable 2-3 cm BCM. No obvious ascites. Well-healed umbilical hernia scar. Extremities: No edema. No muscle wasting. No gross arthritic changes. Neurologic: Alert and oriented. Cranial nerves grossly intact. No asterixis.     LABORATORY STUDIES:  [facility] [place] [facility] Units 5/11/2017 1/11/2017 9/23/2016   WBC 3.4 - 10.8 x10E3/uL 5.9 5.6 4.9   ANC 1.8 - 8.0 K/UL      HGB 12.6 - 17.7 g/dL 14.9 15.4 14.5    - 379 x10E3/uL 162 156 146 (L)   INR 0.8 - 1.2 1.3 (H) 1.3 (H) 1.3 (H)   AST 0 - 40 IU/L 51 (H) 42 (H) 44 (H)   ALT 0 - 44 IU/L 31 33 30   Alk Phos 39 - 117 IU/L 97 112 128 (H)   Bili, Total 0.0 - 1.2 mg/dL 1.1 1.0 1.2   Bili, Direct 0.00 - 0.40 mg/dL 0.40 0.39 0.40   Albumin 3.6 - 4.8 g/dL 4.1 4.0 3.9   BUN 8 - 27 mg/dL 6 (L) 7 (L) 9   Creat 0.76 - 1.27 mg/dL 0.65 (L) 0.66 (L) 0.66 (L)   Na 134 - 144 mmol/L 132 (L) 132 (L) 134   K 3.5 - 5.2 mmol/L 4.1 4.8 4.3   Cl 96 - 106 mmol/L 95 (L) 96 96 (L)   CO2 18 - 29 mmol/L 22 22 23   Glucose 65 - 99 mg/dL 101 (H) 100 (H) 116 (H)     Cancer Screening Latest Ref Rng & Units 5/11/2017 1/11/2017 9/23/2016   AFP, Serum 0.0 - 8.0 ng/mL 2.9 2.4 3.3   AFP-L3% 0.0 - 9.9 % 10.6 (H) Comment 10.5 (H)   Additional lab values drawn at today's office visit are pending at the time of documentation. SEROLOGIES:  Serologies Latest Ref Rng 9/25/2015   Hep A Ab, Total NEGATIVE   NEGATIVE   Hep B Core Ab, Total NEGATIVE   NEGATIVE   Ferritin 26 - 388 NG/ (H)   Iron % Saturation 20 - 50 % 27   Alpha-1 antitrypsin level 90 - 200 mg/dL 165   9/2015. HBsurface antigen negative, Anti-HBsurface negative, anti-HCV negative. LIVER HISTOLOGY:  6/2017. [facility] EkPa was 17. Suggested fibrosis level is F3. ENDOSCOPIC PROCEDURES:  9/2015. EGD performed by [facility]. Medium esophageal varices. Banding performed. Mild portal gastropathy. RADIOLOGY:  9/2015. Ultrasound of liver. Echogenic consistent with cirrhosis. No liver mass lesions. No dilated bile ducts. Severe ascites. 1/2016. CT scan abdomen with and without IV contrast.  Changes consistent with cirrhosis. No liver mass lesions. No dilated bile ducts. Small ascites. 5/2016. Ultrasound of liver. Echogenic consistent with cirrhosis. No liver mass lesions. No dilated bile ducts. No ascites. TIPS patent. 11/2016. Ultrasound of liver. Patent TIPS. No significant ascites. Heterogeneous liver.  5/2017. Ultrasound of liver. Echogenic consistent with cirrhosis. Patent TIPS, no liver mass lesions. No dilated bile ducts. No ascites. OTHER TESTING:  Not available or performed    ASSESSMENT AND PLAN:  Cirrhosis secondary to alcohol. Recent labs show stable liver function dramatically improved since cessation of alcohol. He feels that he is back to his baseline status for strength and stamina. His last Na MELD was calculated at 11, this will now be repeated. As patient is now >12 months sober, consideration for liver transplantation could be entertained if indicated. On the basis of the last labs in 1/2017, patient had shown some marked improvement and there is no indication for transplant consideration at this time. Patient's in-office fibroscan shows a score consistent with F3 changes. While we do not have his baseline presentation score, this is presumed to be an improvement and we plan to continue to monitor periodically in the future for tracking of regression/progression of fibrosis. Ascites and edema have now resolved following placement of TIPS. Recent assessment of TIPS is patent. Patient has discontinued use of diuretics as of ~2 months ago and has had no reaccumulation. I will continue to monitor. Lower extremity edema has resolved. Esophageal varices without prior bleeding. Varices have been banded. TIPS will treat varcies he will not require additional EGD with banding. Follow-up per Dr. Trey Torres. Hepatic encephalopathy has not developed to date. There is no need for treatment with lactulose and/or Xifaxan at this time. No need to restrict dietary protein at this time.   Patient is taking OTC medications as needed to maintain bowel regularity. Insomnia. Patient notes improvement in symptoms with the addition of trazadone, will continue to use prn. The patient was directed to continue all current medications at the current dosages. There are no contraindications for the patient to take any medications that are necessary for treatment of other medical issues. The patient was counseled regarding alcohol consumption. I have congratulated him on attaining >18 months sobriety. Vaccination for viral hepatitis A and B is recommended since the patient has no serologic evidence of previous exposure or vaccination with immunity. [facility] screening has recently been performed and does not suggest [facility] The next liver imaging study will be performed in 11/2017 for US with doppler study. All of the above issues were discussed with the patient. All questions were answered. The patient expressed a clear understanding of the above. [address] [facility] 87 in 5 months with repeat doppler US at that time.     Lisa Ramirez PA-C  Liver [place] of [address] [place] [address], [address] [address]  22.  [phone]

## 2017-06-13 ENCOUNTER — ED HISTORICAL/CONVERTED ENCOUNTER (OUTPATIENT)
Dept: OTHER | Age: 62
End: 2017-06-13

## 2017-07-21 ENCOUNTER — DOCUMENTATION ONLY (OUTPATIENT)
Dept: HEMATOLOGY | Age: 62
End: 2017-07-21

## 2017-07-21 NOTE — PROGRESS NOTES
Received a voicemail from Chinedu Johns at [facility] regarding which shunt patient had been placed. She did not leave a contact number, unable to return call.

## 2017-11-01 ENCOUNTER — HOSPITAL ENCOUNTER (OUTPATIENT)
Dept: ULTRASOUND IMAGING | Age: 62
Discharge: HOME | End: 2017-11-01
Attending: PHYSICIAN ASSISTANT
Payer: COMMERCIAL

## 2017-11-01 DIAGNOSIS — K70.30 ALCOHOLIC CIRRHOSIS OF LIVER WITHOUT ASCITES (HCC): ICD-10-CM

## 2017-11-01 PROCEDURE — 93975 VASCULAR STUDY: CPT

## 2017-11-08 ENCOUNTER — OFFICE VISIT (OUTPATIENT)
Dept: HEMATOLOGY | Age: 62
End: 2017-11-08

## 2017-11-08 VITALS
TEMPERATURE: 98.6 F | DIASTOLIC BLOOD PRESSURE: 86 MMHG | SYSTOLIC BLOOD PRESSURE: 158 MMHG | HEART RATE: 66 BPM | BODY MASS INDEX: 32.07 KG/M2 | OXYGEN SATURATION: 97 % | WEIGHT: 256.6 LBS

## 2017-11-08 DIAGNOSIS — K70.30 ALCOHOLIC CIRRHOSIS OF LIVER WITHOUT ASCITES (HCC): Primary | ICD-10-CM

## 2017-11-08 RX ORDER — TRAMADOL HYDROCHLORIDE 50 MG/1
TABLET ORAL
Refills: 1 | COMMUNITY
Start: 2017-10-09 | End: 2018-03-08

## 2017-11-08 NOTE — MR AVS SNAPSHOT
Visit Information Date & Time Provider Department Dept. Phone Encounter #  
 11/8/2017 11:00 AM Shahzad Chicas, [address] [place] [address] [id number] Follow-up Instructions Return in about 4 months (around 3/8/2018) for [facility]. Upcoming Health Maintenance Date Due DTaP/Tdap/Td series (1 - Tdap) 2/16/1977 ZOSTER VACCINE AGE 60> 12/16/2015 FOBT Q 1 YEAR AGE 50-75 1/19/2017 Influenza Age 5 to Adult 8/1/2017 Allergies as of 11/8/2017  Review Complete On: 11/8/2017 By: Edna Kennedy Severity Noted Reaction Type Reactions Erythromycin High 09/24/2015   Side Effect Other (comments), Rash Headache, n/v, diarrhea Headache, n/v, diarrhea Lipitor [Atorvastatin] High 09/24/2015    Cough, Shortness of Breath Pravastatin High 09/24/2015    Cough, Shortness of Breath Azithromycin Low 01/19/2016    Diarrhea, Palpitations Current Immunizations  Reviewed on 1/20/2016 Name Date Hep A Vaccine 10/8/2015 Hep B Vaccine 11/8/2015 Influenza Vaccine (Quad) PF 9/28/2015  1:14 PM  
 Pneumococcal Polysaccharide (PPSV-23) 9/28/2015  1:15 PM  
  
 Not reviewed this visit You Were Diagnosed With   
  
 Codes Comments Alcoholic cirrhosis of liver without ascites ([facility]    -  Primary ICD-10-CM: K70.30 ICD-9-CM: 571.2 Vitals BP Pulse Temp Weight(growth percentile) SpO2 BMI  
 158/86 66 98.6 °F (37 °C) (Tympanic) 256 lb 9.6 oz (116.4 kg) 97% 32.07 kg/m2 Smoking Status Never Smoker Vitals History BMI and BSA Data Body Mass Index Body Surface Area 32.07 kg/m 2 2.48 m 2 Preferred Pharmacy Pharmacy Name Phone [id number] [place], [place] - [address] [facility] [phone] Your Updated Medication List  
  
   
This list is accurate as of: 11/8/17 11:19 AM.  Always use your most recent med list.  
  
  
  
  
 acetaminophen 325 mg tablet Commonly known as:  TYLENOL Take 325-650 mg by mouth every four (4) hours as needed for Pain. furosemide 40 mg tablet Commonly known as:  LASIX TAKE 1 TABLET BY MOUTH EVERY DAY  
  
 KLOR-CON 10 10 mEq tablet Generic drug:  potassium chloride SR  
TAKE 1 TABLET BY MOUTH ONCE DAILY losartan 50 mg tablet Commonly known as:  COZAAR Take 50 mg by mouth daily. metoprolol tartrate 25 mg tablet Commonly known as:  LOPRESSOR Take  by mouth two (2) times a day. multivitamin tablet Commonly known as:  ONE A DAY Take 1 Tab by mouth daily. omeprazole 40 mg capsule Commonly known as:  PRILOSEC Take 1 Cap by mouth daily. Indications: GASTRIC ULCER  
  
 therapeutic multivitamin tablet Commonly known as:  [facility] Take 1 Tab by mouth daily. traMADol 50 mg tablet Commonly known as:  ULTRAM  
TAKE 1 OR 2 TABLETS BY MOUTH TWICE A DAY AS NEEDED  
  
 traZODone 50 mg tablet Commonly known as:  DESYREL  
TAKE 1 TABLET BY MOUTH EVERY EVENING We Performed the Following AFP WITH AFP-L3% [[id number] Custom] CBC W/O DIFF [23775 CPT(R)] HEPATIC FUNCTION PANEL (6) [[id number] Custom] METABOLIC PANEL, BASIC [37772 CPT(R)] PROTHROMBIN TIME + INR [90604 CPT(R)] Follow-up Instructions Return in about 4 months (around 3/8/2018) for Ar Yin. [facility]! Dear Deya Watts: Thank you for requesting a Touch[employer] account. Our records indicate that you already have an active Touchbase account. You can access your account anytime at [email or web address] Did you know that you can access your hospital and ER discharge instructions at any time in Touchbase? You can also review all of your test results from your hospital stay or ER visit. Additional Information If you have questions, please visit the Frequently Asked Questions section of the [employer] website at [email or web address] Remember, [employer] is NOT to be used for urgent needs. For medical emergencies, dial 911. Now available from your iPhone and Android! Please provide this summary of care documentation to your next provider. Your primary care clinician is listed as Celestine Anaya. If you have any questions after today's visit, please call [phone].

## 2017-11-08 NOTE — PROGRESS NOTES
[address] [facility], MD, FACP, [address]     April Yehuda Jeans, ROSI Bah MD, [address], Jeanie Rhoades MD     [address], AWAIS Cadena, AWAIS Hutchinson Mormon     [address] [facility], [address] Stephanie Lucas  22.     [phone]     FAX: [address] [place] [address], [address] [facility], [address]     [phone]     FAX: [phone]       Patient Care Team:  Celestine Anaya MD as PCP - General (General Practice)  Celestine Anaya MD (General Practice)      Problem List  Date Reviewed: 6/9/2017          Codes Class Noted    Ascites ICD-10-CM: R18.8  ICD-9-CM: 789.59  12/26/2015        S/P TIPS (transjugular intrahepatic portosystemic shunt) ICD-10-CM: J02.813  ICD-9-CM: V45.89  12/26/2015        Cirrhosis, alcoholic ([facility] IQK-81-YA: K70.30  ICD-9-CM: 571.2  10/6/2015        Hyponatremia ICD-10-CM: E87.1  ICD-9-CM: 276.1  9/24/2015               Angela Collins returns to the [address] for management of 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 The active problem list, all pertinent past medical history, medications, endoscopic studies, radiologic findings and laboratory findings related to the liver disorder were reviewed with the patient. The patient is a 65 y.o.  male who was first found to have cirrhosis in 9/2015 when developed ascites. Ascites became refractory to diuretics because of hyponatremia. The patient underwent TIPS for treatment of refactory ascites in 12/2015. He has done extremely since placement of the TIPS. Edema has resolved with diuretics. Patient has done so well with resolution of edema that he discontinued use of all diuretics as of ~6 months ago. He has noted no increase in fluid and has had an improvement in muscle cramping symptoms and subsequent improved sleeping patterns.   He continues with with prn use of trazodone. The patient has esophageal varices without bleeding. Varices have been treated with band ligation and now TIPS. The patient has had some recent weight gain in the last 5-6 months and attributes this to inactivity. He is watching diet carefully. He does not believe that he is having fluid retention and reports feeling better now than he has in years. The patient completes all daily activities without any functional limitations. The patient has not experienced fatigue, fevers, chills, pain in the right side over the liver, problems concentrating, swelling of the abdomen, swelling of the lower extremities, hematemesis, hematochezia. ALLERGIES  Allergies   Allergen Reactions    Erythromycin Other (comments) and Rash     Headache, n/v, diarrhea  Headache, n/v, diarrhea      Lipitor [Atorvastatin] Cough and Shortness of Breath    Pravastatin Cough and Shortness of Breath    Azithromycin Diarrhea and Palpitations       MEDICATIONS  Current Outpatient Prescriptions   Medication Sig    traMADol (ULTRAM) 50 mg tablet TAKE 1 OR 2 TABLETS BY MOUTH TWICE A DAY AS NEEDED    traZODone (DESYREL) 50 mg tablet TAKE 1 TABLET BY MOUTH EVERY EVENING    KLOR-CON 10 10 mEq tablet TAKE 1 TABLET BY MOUTH ONCE DAILY    metoprolol tartrate (LOPRESSOR) 25 mg tablet Take  by mouth two (2) times a day.  furosemide (LASIX) 40 mg tablet TAKE 1 TABLET BY MOUTH EVERY DAY    losartan (COZAAR) 50 mg tablet Take 50 mg by mouth daily.  omeprazole (PRILOSEC) 40 mg capsule Take 1 Cap by mouth daily. Indications: GASTRIC ULCER    multivitamin (ONE A DAY) tablet Take 1 Tab by mouth daily.  acetaminophen (TYLENOL) 325 mg tablet Take 325-650 mg by mouth every four (4) hours as needed for Pain.  therapeutic multivitamin (THERAGRAN) tablet Take 1 Tab by mouth daily. No current facility-administered medications for this visit.         SYSTEM REVIEW NOT RELATED TO LIVER DISEASE OR REVIEWED ABOVE:  Constitution systems: Negative for fever, chills, weight loss. Weight stable over past several months up 5 #..  Eyes: Negative for visual changes. ENT: Negative for sore throat, painful swallowing. Respiratory: Negative for cough, hemoptysis, SOB. Cardiology: Negative for chest pain, palpitations. GI:  Negative for constipation or diarrhea. : Negative for urinary frequency, dysuria, hematuria, nocturia. Skin: Negative for rash. Well-healed umbilical scar. Hematology: Negative for easy bruising, blood clots. Musculo-skeletal: Negative for back pain, muscle pain, weakness. Neurologic: Negative for headaches, dizziness, vertigo, memory problems not related to HE. Psychology: Negative for anxiety, depression. FAMILY HISTORY:  The father  of head and neck CA. The mother  of brain cancer. There is no family history of liver disease. SOCIAL HISTORY:  The patient is . The patient has 1 child and 1 grandchild. The patient has never used tobacco products. The patient previously consumed 4-6 alcoholic beverages per day. He states that he has had no alcohol since late 2015. The patient used to work as an  for [employer]. The patient retired in 2015. PHYSICAL EXAMINATION:  Visit Vitals    /86    Pulse 66    Temp 98.6 °F (37 °C) (Tympanic)    Wt 256 lb 9.6 oz (116.4 kg)    SpO2 97%    BMI 32.07 kg/m2     General: No acute distress. Eyes: Sclera anicteric. ENT: No oral lesions. Thyroid normal.  Nodes: No adenopathy. Skin: No spider angiomata. No jaundice. No palmar erythema. Respiratory: Lungs clear to auscultation. Cardiovascular: Regular heart rate. Grade I/VI holosystolic murmur. No JVD. Some minimal nipple enlargement. Abdomen: Soft non-tender. Liver palpable 2-3 cm BCM. No obvious ascites. Well-healed umbilical hernia scar. Extremities: No edema. No muscle wasting.   No gross arthritic changes. Neurologic: Alert and oriented. Cranial nerves grossly intact. No asterixis. LABORATORY STUDIES:  [facility] [place] [facility] [address] & Units 11/8/2017 5/11/2017 1/11/2017   WBC 3.4 - 10.8 x10E3/uL 5.3 5.9 5.6   ANC 1.8 - 8.0 K/UL      HGB 12.6 - 17.7 g/dL 14.8 14.9 15.4    - 379 x10E3/uL 159 162 156   INR 0.8 - 1.2 1.3 (H) 1.3 (H) 1.3 (H)   AST 0 - 40 IU/L 42 (H) 51 (H) 42 (H)   ALT 0 - 44 IU/L 26 31 33   Alk Phos 39 - 117 IU/L 91 97 112   Bili, Total 0.0 - 1.2 mg/dL 0.9 1.1 1.0   Bili, Direct 0.00 - 0.40 mg/dL 0.32 0.40 0.39   Albumin 3.6 - 4.8 g/dL 3.8 4.1 4.0   BUN 8 - 27 mg/dL 7 (L) 6 (L) 7 (L)   Creat 0.76 - 1.27 mg/dL 0.82 0.65 (L) 0.66 (L)   Na 134 - 144 mmol/L 132 (L) 132 (L) 132 (L)   K 3.5 - 5.2 mmol/L 4.6 4.1 4.8   Cl 96 - 106 mmol/L 98 95 (L) 96   CO2 18 - 29 mmol/L 23 22 22   Glucose 65 - 99 mg/dL 94 101 (H) 100 (H)     Cancer Screening Latest Ref Rng & Units 11/8/2017 5/11/2017 1/11/2017   AFP, Serum 0.0 - 8.0 ng/mL 2.3 2.9 2.4   AFP-L3% 0.0 - 9.9 % Comment 10.6 (H) Comment   Additional lab values drawn at today's office visit are pending at the time of documentation. SEROLOGIES:  Serologies Latest Ref Rng 9/25/2015   Hep A Ab, Total NEGATIVE   NEGATIVE   Hep B Core Ab, Total NEGATIVE   NEGATIVE   Ferritin 26 - 388 NG/ (H)   Iron % Saturation 20 - 50 % 27   Alpha-1 antitrypsin level 90 - 200 mg/dL 165   9/2015. HBsurface antigen negative, Anti-HBsurface negative, anti-HCV negative. LIVER HISTOLOGY:  6/2017. [facility]. EkPa was 17. Suggested fibrosis level is F3. ENDOSCOPIC PROCEDURES:  9/2015. EGD performed by [facility]. Medium esophageal varices. Banding performed. Mild portal gastropathy. RADIOLOGY:  9/2015. Ultrasound of liver. Echogenic consistent with cirrhosis. No liver mass lesions. No dilated bile ducts. Severe ascites. 1/2016.   CT scan abdomen with and without IV contrast.  Changes consistent with cirrhosis. No liver mass lesions. No dilated bile ducts. Small ascites. 5/2016. Ultrasound of liver. Echogenic consistent with cirrhosis. No liver mass lesions. No dilated bile ducts. No ascites. TIPS patent. 11/2016. Ultrasound of liver. Patent TIPS. No significant ascites. Heterogeneous liver.  5/2017. Ultrasound of liver. Echogenic consistent with cirrhosis. Patent TIPS, no liver mass lesions. No dilated bile ducts. No ascites. 11/2017. Ultrasound of liver. Echogenic consistent with chronic liver disease. Patent TIPS, but with some diminished velocities. No liver mass lesions. No dilated bile ducts. No ascites. OTHER TESTING:  Not available or performed    ASSESSMENT AND PLAN:  Cirrhosis secondary to alcohol. Recent labs show stable liver function dramatically improved since cessation of alcohol. He feels that he is back to his baseline status for strength and stamina. His last Na MELD was calculated at 11. As patient is now 2 years sober, consideration for liver transplantation could be entertained if indicated. On the basis of the last labs in 1/2017, patient had shown some marked improvement and there is no indication for transplant consideration at this time. Ascites and edema have now resolved following placement of TIPS. Recent assessment of TIPS is patent but there is some decline in velocity of flow. No indication at present for revision as there is no indication of ascites. Patient has discontinued use of diuretics as of ~6 months ago and has had no reaccumulation. I will continue to monitor. Lower extremity edema has resolved. Esophageal varices without prior bleeding. Varices have been banded. TIPS will treat varcies he will not require additional EGD with banding. Follow-up per Dr. Cherie Field. Hepatic encephalopathy has not developed to date. There is no need for treatment with lactulose and/or Xifaxan at this time.   No need to restrict dietary protein at this time. Patient is taking OTC medications as needed to maintain bowel regularity. Insomnia. Patient notes improvement in symptoms with the addition of trazadone, will continue to use prn. The patient was directed to continue all current medications at the current dosages. There are no contraindications for the patient to take any medications that are necessary for treatment of other medical issues. The patient was counseled regarding alcohol consumption. I have congratulated him on attaining >2 years' sobriety. Vaccination for viral hepatitis A and B is recommended since the patient has no serologic evidence of previous exposure or vaccination with immunity. [address] screening has recently been performed and does not suggest [address] The next liver imaging study will be performed in 5/2018 for US with doppler study. All of the above issues were discussed with the patient. All questions were answered. The patient expressed a clear understanding of the above. [facility] 87 in 3-4 months.     Juan Hutchinson PA-C  [facility] [place] of [address] [place] [address], [address] Stephanie Lucas  22.  [phone]

## 2017-11-09 LAB
AFP L3 MFR SERPL: NORMAL % (ref 0–9.9)
AFP SERPL-MCNC: 2.3 NG/ML (ref 0–8)
ALBUMIN SERPL-MCNC: 3.8 G/DL (ref 3.6–4.8)
ALP SERPL-CCNC: 91 IU/L (ref 39–117)
ALT SERPL-CCNC: 26 IU/L (ref 0–44)
AST SERPL-CCNC: 42 IU/L (ref 0–40)
BILIRUB DIRECT SERPL-MCNC: 0.32 MG/DL (ref 0–0.4)
BILIRUB SERPL-MCNC: 0.9 MG/DL (ref 0–1.2)
BUN SERPL-MCNC: 7 MG/DL (ref 8–27)
BUN/CREAT SERPL: 9 (ref 10–24)
CALCIUM SERPL-MCNC: 9 MG/DL (ref 8.6–10.2)
CHLORIDE SERPL-SCNC: 98 MMOL/L (ref 96–106)
CO2 SERPL-SCNC: 23 MMOL/L (ref 18–29)
CREAT SERPL-MCNC: 0.82 MG/DL (ref 0.76–1.27)
ERYTHROCYTE [DISTWIDTH] IN BLOOD BY AUTOMATED COUNT: 13.5 % (ref 12.3–15.4)
GFR SERPLBLD CREATININE-BSD FMLA CKD-EPI: 110 ML/MIN/1.73
GFR SERPLBLD CREATININE-BSD FMLA CKD-EPI: 95 ML/MIN/1.73
GLUCOSE SERPL-MCNC: 94 MG/DL (ref 65–99)
HCT VFR BLD AUTO: 40.6 % (ref 37.5–51)
HGB BLD-MCNC: 14.8 G/DL (ref 12.6–17.7)
INR PPP: 1.3 (ref 0.8–1.2)
MCH RBC QN AUTO: 32.1 PG (ref 26.6–33)
MCHC RBC AUTO-ENTMCNC: 36.5 G/DL (ref 31.5–35.7)
MCV RBC AUTO: 88 FL (ref 79–97)
MORPHOLOGY BLD-IMP: ABNORMAL
PLATELET # BLD AUTO: 159 X10E3/UL (ref 150–379)
POTASSIUM SERPL-SCNC: 4.6 MMOL/L (ref 3.5–5.2)
PROTHROMBIN TIME: 13.2 SEC (ref 9.1–12)
RBC # BLD AUTO: 4.61 X10E6/UL (ref 4.14–5.8)
SODIUM SERPL-SCNC: 132 MMOL/L (ref 134–144)
WBC # BLD AUTO: 5.3 X10E3/UL (ref 3.4–10.8)

## 2017-11-10 NOTE — PROGRESS NOTES
Reviewed with patient at the time of office visit, no present indication for revision. Repeat study in 6 months.

## 2018-03-08 ENCOUNTER — OFFICE VISIT (OUTPATIENT)
Dept: HEMATOLOGY | Age: 63
End: 2018-03-08

## 2018-03-08 VITALS
WEIGHT: 228 LBS | SYSTOLIC BLOOD PRESSURE: 151 MMHG | BODY MASS INDEX: 28.5 KG/M2 | HEART RATE: 59 BPM | DIASTOLIC BLOOD PRESSURE: 96 MMHG | OXYGEN SATURATION: 99 % | TEMPERATURE: 96.2 F

## 2018-03-08 DIAGNOSIS — K70.30 ALCOHOLIC CIRRHOSIS OF LIVER WITHOUT ASCITES (HCC): Primary | ICD-10-CM

## 2018-03-08 NOTE — PROGRESS NOTES
[address], MD, FACP, [facility]     April Christopher Trujillo, ROSI Vasquez MD, Gi Spence MD     [address], AWIAS Miller NP        [address] [facility], [address] [facility]     [address].     [phone]     FAX: 084 [facility], [address] [place] [address], [address]     [phone]     FAX: [phone]       Patient Care Team:  Alexa Garcia MD as PCP - General ([facility])  Alexa Garcia MD (General Practice)      Problem List  Date Reviewed: 11/9/2017          Codes Class Noted    Ascites ICD-10-CM: R18.8  ICD-9-CM: 789.59  12/26/2015        S/P TIPS (transjugular intrahepatic portosystemic shunt) ICD-10-CM: P48.391  ICD-9-CM: V45.89  12/26/2015        Cirrhosis, alcoholic ([facility] DWR-18-XY: K70.30  ICD-9-CM: 571.2  10/6/2015        Hyponatremia ICD-10-CM: E87.1  ICD-9-CM: 276.1  9/24/2015               Freida Kirk returns to the [address] for management of 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 The active problem list, all pertinent past medical history, medications, endoscopic studies, radiologic findings and laboratory findings related to the liver disorder were reviewed with the patient. The patient is a 59 y.o.  male who was first found to have cirrhosis in 9/2015 when developed ascites. Ascites became refractory to diuretics because of hyponatremia. The patient underwent TIPS for treatment of refactory ascites in 12/2015. He has done extremely since placement of the TIPS. Edema has resolved with diuretics. Patient has done so well with resolution of edema that he discontinued use of all diuretics as of ~5/2017. He has noted no increase in fluid and has had an improvement in muscle cramping symptoms and subsequent improved sleeping patterns.   He continues with with prn use of trazodone. The patient has esophageal varices without bleeding. Varices have been treated with band ligation and now TIPS. The patient has had significant intentional weight loss of ~30# since the new year. He is doing this largely through calorie restrictions and states that he is feeling better now than he has in many years. He has a target weight of 210-215#. The patient completes all daily activities without any functional limitations. The patient has not experienced fatigue, fevers, chills, pain in the right side over the liver, problems concentrating, swelling of the abdomen, swelling of the lower extremities, hematemesis, hematochezia. ALLERGIES  Allergies   Allergen Reactions    Erythromycin Other (comments) and Rash     Headache, n/v, diarrhea  Headache, n/v, diarrhea      Lipitor [Atorvastatin] Cough and Shortness of Breath    Pravastatin Cough and Shortness of Breath    Azithromycin Diarrhea and Palpitations       MEDICATIONS  Current Outpatient Prescriptions   Medication Sig    metoprolol tartrate (LOPRESSOR) 25 mg tablet Take  by mouth two (2) times a day.  losartan (COZAAR) 50 mg tablet Take 50 mg by mouth daily.  traMADol (ULTRAM) 50 mg tablet TAKE 1 OR 2 TABLETS BY MOUTH TWICE A DAY AS NEEDED    traZODone (DESYREL) 50 mg tablet TAKE 1 TABLET BY MOUTH EVERY EVENING    KLOR-CON 10 10 mEq tablet TAKE 1 TABLET BY MOUTH ONCE DAILY    furosemide (LASIX) 40 mg tablet TAKE 1 TABLET BY MOUTH EVERY DAY    omeprazole (PRILOSEC) 40 mg capsule Take 1 Cap by mouth daily. Indications: GASTRIC ULCER    multivitamin (ONE A DAY) tablet Take 1 Tab by mouth daily.  acetaminophen (TYLENOL) 325 mg tablet Take 325-650 mg by mouth every four (4) hours as needed for Pain.  therapeutic multivitamin (THERAGRAN) tablet Take 1 Tab by mouth daily. No current facility-administered medications for this visit.         SYSTEM REVIEW NOT RELATED TO LIVER DISEASE OR REVIEWED ABOVE:  Constitution systems: Negative for fever, chills, weight loss. Weight loss of ~30# at this point. Eyes: Negative for visual changes. ENT: Negative for sore throat, painful swallowing. Respiratory: Negative for cough, hemoptysis, SOB. Cardiology: Negative for chest pain, palpitations. GI:  Negative for constipation or diarrhea. : Negative for urinary frequency, dysuria, hematuria, nocturia. Skin: Negative for rash. Well-healed umbilical scar. Hematology: Negative for easy bruising, blood clots. Musculo-skeletal: Negative for back pain, muscle pain, weakness. Neurologic: Negative for headaches, dizziness, vertigo, memory problems not related to HE. Psychology: Negative for anxiety, depression. FAMILY HISTORY:  The father  of head and neck CA. The mother  of brain cancer. There is no family history of liver disease. SOCIAL HISTORY:  The patient is . The patient has 1 child and 1 grandchild. The patient has never used tobacco products. The patient previously consumed 4-6 alcoholic beverages per day. He states that he has had no alcohol since late 2015. The patient used to work as an  for [employer] at [employer]. The patient retired in 2015. PHYSICAL EXAMINATION:  Visit Vitals    BP (!) 151/96 (BP 1 Location: Left arm, BP Patient Position: Sitting)    Pulse (!) 59    Temp 96.2 °F (35.7 °C) (Tympanic)    Wt 228 lb (103.4 kg)    SpO2 99%    BMI 28.5 kg/m2     General: No acute distress. Eyes: Sclera anicteric. ENT: No oral lesions. Thyroid normal.  Nodes: No adenopathy. Skin: No spider angiomata. No jaundice. No palmar erythema. Respiratory: Lungs clear to auscultation. Cardiovascular: Regular heart rate. Grade I/VI holosystolic murmur. No JVD. Abdomen: Soft non-tender. Liver palpable 2-3 cm BCM. No obvious ascites. Well-healed umbilical hernia scar. Extremities: No edema. No muscle wasting.   No gross arthritic changes. Neurologic: Alert and oriented. Cranial nerves grossly intact. No asterixis. LABORATORY STUDIES:  Liver [place] [facility] [place] Jose Chaney & Units 11/8/2017 5/11/2017 1/11/2017   WBC 3.4 - 10.8 x10E3/uL 5.3 5.9 5.6   ANC 1.8 - 8.0 K/UL      HGB 12.6 - 17.7 g/dL 14.8 14.9 15.4    - 379 x10E3/uL 159 162 156   INR 0.8 - 1.2 1.3 (H) 1.3 (H) 1.3 (H)   AST 0 - 40 IU/L 42 (H) 51 (H) 42 (H)   ALT 0 - 44 IU/L 26 31 33   Alk Phos 39 - 117 IU/L 91 97 112   Bili, Total 0.0 - 1.2 mg/dL 0.9 1.1 1.0   Bili, Direct 0.00 - 0.40 mg/dL 0.32 0.40 0.39   Albumin 3.6 - 4.8 g/dL 3.8 4.1 4.0   BUN 8 - 27 mg/dL 7 (L) 6 (L) 7 (L)   Creat 0.76 - 1.27 mg/dL 0.82 0.65 (L) 0.66 (L)   Na 134 - 144 mmol/L 132 (L) 132 (L) 132 (L)   K 3.5 - 5.2 mmol/L 4.6 4.1 4.8   Cl 96 - 106 mmol/L 98 95 (L) 96   CO2 18 - 29 mmol/L 23 22 22   Glucose 65 - 99 mg/dL 94 101 (H) 100 (H)     Cancer Screening Latest Ref Rng & Units 11/8/2017 5/11/2017 1/11/2017   AFP, Serum 0.0 - 8.0 ng/mL 2.3 2.9 2.4   AFP-L3% 0.0 - 9.9 % Comment 10.6 (H) Comment   Additional lab values drawn at today's office visit are pending at the time of documentation. SEROLOGIES:  Serologies Latest Ref Rng 9/25/2015   Hep A Ab, Total NEGATIVE   NEGATIVE   Hep B Core Ab, Total NEGATIVE   NEGATIVE   Ferritin 26 - 388 NG/ (H)   Iron % Saturation 20 - 50 % 27   Alpha-1 antitrypsin level 90 - 200 mg/dL 165   9/2015. HBsurface antigen negative, Anti-HBsurface negative, anti-HCV negative. LIVER HISTOLOGY:  6/2017. FibroScan performed at [address]. EkPa was 17. Suggested fibrosis level is F3. ENDOSCOPIC PROCEDURES:  9/2015. EGD performed by [facility]. Medium esophageal varices. Banding performed. Mild portal gastropathy. RADIOLOGY:  9/2015. Ultrasound of liver. Echogenic consistent with cirrhosis. No liver mass lesions. No dilated bile ducts. Severe ascites. 1/2016.   CT scan abdomen with and without IV contrast.  Changes consistent with cirrhosis. No liver mass lesions. No dilated bile ducts. Small ascites. 5/2016. Ultrasound of liver. Echogenic consistent with cirrhosis. No liver mass lesions. No dilated bile ducts. No ascites. TIPS patent. 11/2016. Ultrasound of liver. Patent TIPS. No significant ascites. Heterogeneous liver.  5/2017. Ultrasound of liver. Echogenic consistent with cirrhosis. Patent TIPS, no liver mass lesions. No dilated bile ducts. No ascites. 11/2017. Ultrasound of liver. Echogenic consistent with chronic liver disease. Patent TIPS, but with some diminished velocities. No liver mass lesions. No dilated bile ducts. No ascites. OTHER TESTING:  Not available or performed    ASSESSMENT AND PLAN:  Cirrhosis secondary to alcohol. Recent labs show stable liver function dramatically improved since cessation of alcohol. He feels that he is back to his baseline status for strength and stamina. He is doing even better with the recent weight losses. On the basis of the ongoing improving labs, patient had shown some marked improvement and there is no indication for transplant consideration at this time. Ascites and edema have now resolved following placement of TIPS. Recent assessment of TIPS is patent but there is some decline in velocity of flow. No indication at present for revision as there is no indication of ascites. Patient has discontinued use of diuretics as of 5/2017 and has had no reaccumulation. I will continue to monitor. Lower extremity edema has resolved. Esophageal varices without prior bleeding. Varices have been banded. TIPS will treat varices he will not require additional EGD with banding. Follow-up per Dr. Dallas Arita. Hepatic encephalopathy has not developed to date. There is no need for treatment with lactulose and/or Xifaxan at this time. No need to restrict dietary protein at this time.   Patient is taking OTC medications as needed to maintain bowel regularity. Insomnia. Patient notes improvement in symptoms with the addition of trazadone, will continue to use prn. The patient was directed to continue all current medications at the current dosages. There are no contraindications for the patient to take any medications that are necessary for treatment of other medical issues. The patient was counseled regarding alcohol consumption. I have congratulated him on attaining >2 years' sobriety. Vaccination for viral hepatitis A and B is recommended since the patient has no serologic evidence of previous exposure or vaccination with immunity. [facility] [address] screening has recently been performed and does not suggest [address] The next liver imaging study will be performed in 5/2018 for US with doppler study, this has been ordered. All of the above issues were discussed with the patient. All questions were answered. The patient expressed a clear understanding of the above. [address] [address] in 3-4 months with repeat US of the TIPS prior to follow-up.     Delmy Rao PA-C  [facility] [place] of [address] [place] [address], [address] Stephanie Lucas  22.  [phone]

## 2018-03-08 NOTE — MR AVS SNAPSHOT
[address] [facility] [phone] [address] 
[phone] Patient: Patti Reis MRN: [id number] Visit Information Date & Time Provider Department Dept. Phone Encounter #  
 3/8/2018 11:00 AM Alan Melendez, [address] [facility] [address] [place] [address] [id number] Follow-up Instructions Return in about 3 months (around 6/8/2018) for Malu Lemus. Your Appointments 6/8/2018 10:00 AM  
Follow Up with NANCY Benedict [address] ([facility] Appt Note: Follow up [address] [place] [facility] [phone] [facility] [address]  
[address] [facility] 1 Yves Santoro Upcoming Health Maintenance Date Due DTaP/Tdap/Td series (1 - Tdap) 2[birth year]16/1976 ZOSTER VACCINE AGE 60> 12/16/2015 FOBT Q 1 YEAR AGE 50-75 1/19/2017 Influenza Age 5 to Adult 8/1/2017 Allergies as of 3/8/2018  Review Complete On: 3/8/2018 By: NANCY Benedict Severity Noted Reaction Type Reactions Erythromycin High 09/24/2015   Side Effect Other (comments), Rash Headache, n/v, diarrhea Headache, n/v, diarrhea Lipitor [Atorvastatin] High 09/24/2015    Cough, Shortness of Breath Pravastatin High 09/24/2015    Cough, Shortness of Breath Azithromycin Low 01/19/2016    Diarrhea, Palpitations Current Immunizations  Reviewed on 1/20/2016 Name Date Hep A Vaccine 10/8/2015 Hep B Vaccine 11/8/2015 Influenza Vaccine (Quad) PF 9/28/2015  1:14 PM  
 Pneumococcal Polysaccharide (PPSV-23) 9/28/2015  1:15 PM  
  
 Not reviewed this visit You Were Diagnosed With   
  
 Codes Comments Alcoholic cirrhosis of liver without ascites ([facility]    -  Primary ICD-10-CM: K70.30 ICD-9-CM: 571.2 Vitals  BP Pulse Temp Weight(growth percentile) SpO2 BMI  
 (!) 151/96 (BP 1 Location: Left arm, BP Patient Position: Sitting) (!) 59 96.2 °F (35.7 °C) (Tympanic) 228 lb (103.4 kg) 99% 28.5 kg/m2 Smoking Status Never Smoker BMI and BSA Data Body Mass Index Body Surface Area 28.5 kg/m 2 2.34 m 2 Preferred Pharmacy Pharmacy Name Phone [id number] [place], [place] - [address] [facility] [phone] Your Updated Medication List  
  
   
This list is accurate as of 3/8/18 11:12 AM.  Always use your most recent med list.  
  
  
  
  
 losartan 50 mg tablet Commonly known as:  COZAAR Take 50 mg by mouth daily. metoprolol tartrate 25 mg tablet Commonly known as:  LOPRESSOR Take  by mouth two (2) times a day. We Performed the Following AFP WITH AFP-L3% [[id number] Custom] CBC W/O DIFF [25669 CPT(R)] HEPATIC FUNCTION PANEL (6) [[id number] Custom] METABOLIC PANEL, BASIC [55327 CPT(R)] PROTHROMBIN TIME + INR [49503 CPT(R)] Follow-up Instructions Return in about 3 months (around 6/8/2018) for Piyush Loaiza. To-Do List   
 05/30/2018 Imaging:  DUPLEX ABD VISC ART/MICHAEL/ORGANS COMPLETE Introducing [facility]! Dear Son Quiroz: Thank you for requesting a [employer] account. Our records indicate that you already have an active [employer] account. You can access your account anytime at [email or web address] vida [employer] [email or web address] Did you know that you can access your hospital and ER discharge instructions at any time in [employer]? You can also review all of your test results from your hospital stay or ER visit. Additional Information If you have questions, please visit the Frequently Asked Questions section of the [employer] website at [email or web address] Remember, [employer] is NOT to be used for urgent needs. For medical emergencies, dial 911. Now available from your iPhone and Android! Please provide this summary of care documentation to your next provider. Your primary care clinician is listed as Herb Ribera. If you have any questions after today's visit, please call [phone].

## 2018-03-08 NOTE — PROGRESS NOTES
1. Have you been to the ER, urgent care clinic since your last visit? Hospitalized since your last visit? No    2. Have you seen or consulted any other health care providers outside of the [address] [place] [address] since your last visit? Include any pap smears or colon screening. No   Chief Complaint   Patient presents with    Follow-up     Visit Vitals    BP (!) 151/96 (BP 1 Location: Left arm, BP Patient Position: Sitting)    Pulse (!) 59    Temp 96.2 °F (35.7 °C) (Tympanic)    Wt 228 lb (103.4 kg)    SpO2 99%    BMI 28.5 kg/m2     PHQ over the last two weeks 3/8/2018   Little interest or pleasure in doing things Not at all   Feeling down, depressed or hopeless Not at all   Total Score PHQ 2 0     Learning Assessment 3/8/2018   PRIMARY LEARNER Patient   HIGHEST LEVEL OF EDUCATION - PRIMARY LEARNER  -   BARRIERS PRIMARY LEARNER NONE   CO-LEARNER CAREGIVER No   PRIMARY LANGUAGE ENGLISH   LEARNER PREFERENCE PRIMARY LISTENING   LEARNING SPECIAL TOPICS -   ANSWERED BY patient    RELATIONSHIP SELF     Abuse Screening Questionnaire 3/8/2018   Do you ever feel afraid of your partner? N   Are you in a relationship with someone who physically or mentally threatens you? N   Is it safe for you to go home?  Hue Malagon

## 2018-03-09 LAB
AFP L3 MFR SERPL: NORMAL % (ref 0–9.9)
AFP SERPL-MCNC: 1.6 NG/ML (ref 0–8)
ALBUMIN SERPL-MCNC: 3.9 G/DL (ref 3.6–4.8)
ALP SERPL-CCNC: 95 IU/L (ref 39–117)
ALT SERPL-CCNC: 31 IU/L (ref 0–44)
AST SERPL-CCNC: 43 IU/L (ref 0–40)
BILIRUB DIRECT SERPL-MCNC: 0.5 MG/DL (ref 0–0.4)
BILIRUB SERPL-MCNC: 1.5 MG/DL (ref 0–1.2)
BUN SERPL-MCNC: 6 MG/DL (ref 8–27)
BUN/CREAT SERPL: 8 (ref 10–24)
CALCIUM SERPL-MCNC: 9.1 MG/DL (ref 8.6–10.2)
CHLORIDE SERPL-SCNC: 96 MMOL/L (ref 96–106)
CO2 SERPL-SCNC: 23 MMOL/L (ref 18–29)
CREAT SERPL-MCNC: 0.78 MG/DL (ref 0.76–1.27)
ERYTHROCYTE [DISTWIDTH] IN BLOOD BY AUTOMATED COUNT: 14.3 % (ref 12.3–15.4)
GFR SERPLBLD CREATININE-BSD FMLA CKD-EPI: 112 ML/MIN/1.73
GFR SERPLBLD CREATININE-BSD FMLA CKD-EPI: 97 ML/MIN/1.73
GLUCOSE SERPL-MCNC: 88 MG/DL (ref 65–99)
HCT VFR BLD AUTO: 41.6 % (ref 37.5–51)
HGB BLD-MCNC: 15.1 G/DL (ref 13–17.7)
INR PPP: 1.3 (ref 0.8–1.2)
MCH RBC QN AUTO: 32.5 PG (ref 26.6–33)
MCHC RBC AUTO-ENTMCNC: 36.3 G/DL (ref 31.5–35.7)
MCV RBC AUTO: 90 FL (ref 79–97)
PLATELET # BLD AUTO: 174 X10E3/UL (ref 150–379)
POTASSIUM SERPL-SCNC: 4.6 MMOL/L (ref 3.5–5.2)
PROTHROMBIN TIME: 13.6 SEC (ref 9.1–12)
RBC # BLD AUTO: 4.64 X10E6/UL (ref 4.14–5.8)
SODIUM SERPL-SCNC: 130 MMOL/L (ref 134–144)
WBC # BLD AUTO: 5.9 X10E3/UL (ref 3.4–10.8)

## 2018-05-14 ENCOUNTER — HOSPITAL ENCOUNTER (OUTPATIENT)
Dept: ULTRASOUND IMAGING | Age: 63
Discharge: HOME | End: 2018-05-14
Attending: PHYSICIAN ASSISTANT
Payer: COMMERCIAL

## 2018-05-14 DIAGNOSIS — K70.30 ALCOHOLIC CIRRHOSIS OF LIVER WITHOUT ASCITES (HCC): ICD-10-CM

## 2018-05-14 PROCEDURE — 93975 VASCULAR STUDY: CPT

## 2018-06-08 ENCOUNTER — OFFICE VISIT (OUTPATIENT)
Dept: HEMATOLOGY | Age: 63
End: 2018-06-08

## 2018-06-08 VITALS
OXYGEN SATURATION: 99 % | HEART RATE: 53 BPM | SYSTOLIC BLOOD PRESSURE: 138 MMHG | TEMPERATURE: 97.7 F | DIASTOLIC BLOOD PRESSURE: 78 MMHG | WEIGHT: 228 LBS | BODY MASS INDEX: 28.5 KG/M2

## 2018-06-08 DIAGNOSIS — Z95.828 S/P TIPS (TRANSJUGULAR INTRAHEPATIC PORTOSYSTEMIC SHUNT): ICD-10-CM

## 2018-06-08 DIAGNOSIS — K70.30 ALCOHOLIC CIRRHOSIS OF LIVER WITHOUT ASCITES (HCC): Primary | ICD-10-CM

## 2018-06-08 RX ORDER — TRAMADOL HYDROCHLORIDE 50 MG/1
50 TABLET ORAL
COMMUNITY
End: 2021-07-28

## 2018-06-08 NOTE — PROGRESS NOTES
[facility], MD, FACP, Cite Rene Valente     April Ryanne Munoz, ROSI Jernigan MD, Heather Juares MD     [facility] [place], AWAIS Frias, AWAIS        [facility]     [address] [facility], [address] [address]  [address].     [phone]     FAX: [address] [place] [address], [address]     [phone]     FAX: [phone]       Patient Care Team:  Priyank Maciel MD as PCP - General (General Practice)  Priyank Maciel MD (General Practice)      Problem List  Date Reviewed: 3/8/2018          Codes Class Noted    Ascites ICD-10-CM: R18.8  ICD-9-CM: 789.59  12/26/2015        S/P TIPS (transjugular intrahepatic portosystemic shunt) ICD-10-CM: F46.128  ICD-9-CM: V45.89  12/26/2015        Cirrhosis, alcoholic ([facility] GDO-65-DV: K70.30  ICD-9-CM: 571.2  10/6/2015        Hyponatremia ICD-10-CM: E87.1  ICD-9-CM: 276.1  9/24/2015               Kelsey Delaney returns to the [address] for management of 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 The active problem list, all pertinent past medical history, medications, endoscopic studies, radiologic findings and laboratory findings related to the liver disorder were reviewed with the patient. The patient is a 59 y.o.  male who was first found to have cirrhosis in 9/2015 when developed ascites. Ascites became refractory to diuretics because of hyponatremia. The patient underwent TIPS for treatment of refactory ascites in 12/2015. He has done extremely since placement of the TIPS. Edema has resolved with diuretics. Patient has done so well with resolution of edema that he discontinued use of all diuretics as of ~5/2017. He has noted no increase in fluid and has had an improvement in muscle cramping symptoms and subsequent improved sleeping patterns.   He continues with with prn use of trazodone. The patient has esophageal varices without bleeding. Varices have been treated with band ligation and now TIPS. Recent US of the liver in 5/2018 showed that this was patent. The patient has had significant intentional weight loss of ~30# in the past year. He is doing this largely through calorie restrictions and states that he is feeling better now than he has in many years. He has a goal target weight of 210-215#. The patient completes all daily activities without any functional limitations. The patient has not experienced fatigue, fevers, chills, pain in the right side over the liver, problems concentrating, swelling of the abdomen, swelling of the lower extremities, hematemesis, hematochezia. ALLERGIES  Allergies   Allergen Reactions    Erythromycin Other (comments) and Rash     Headache, n/v, diarrhea  Headache, n/v, diarrhea      Lipitor [Atorvastatin] Cough and Shortness of Breath    Pravastatin Cough and Shortness of Breath    Azithromycin Diarrhea and Palpitations       MEDICATIONS  Current Outpatient Prescriptions   Medication Sig    traMADol (ULTRAM) 50 mg tablet Take 50 mg by mouth every six (6) hours as needed for Pain.  metoprolol tartrate (LOPRESSOR) 25 mg tablet Take  by mouth two (2) times a day.  losartan (COZAAR) 50 mg tablet Take 50 mg by mouth daily. No current facility-administered medications for this visit. SYSTEM REVIEW NOT RELATED TO LIVER DISEASE OR REVIEWED ABOVE:  Constitution systems: Negative for fever, chills, weight loss. Weight loss of ~30# at this point. Eyes: Negative for visual changes. ENT: Negative for sore throat, painful swallowing. Respiratory: Negative for cough, hemoptysis, SOB. Cardiology: Negative for chest pain, palpitations. GI:  Negative for constipation or diarrhea. : Negative for urinary frequency, dysuria, hematuria, nocturia. Skin: Negative for rash.   Well-healed umbilical scar.  Hematology: Negative for easy bruising, blood clots. Musculo-skeletal: Negative for back pain, muscle pain, weakness. Neurologic: Negative for headaches, dizziness, vertigo, memory problems not related to HE. Psychology: Negative for anxiety, depression. FAMILY HISTORY:  The father  of head and neck CA. The mother  of brain cancer. There is no family history of liver disease. SOCIAL HISTORY:  The patient is . The patient has 1 child and 1 grandchild. The patient has never used tobacco products. The patient previously consumed 4-6 alcoholic beverages per day. He states that he has had no alcohol since late 2015. The patient used to work as an  for [employer] at [employer]. The patient retired in 2015. PHYSICAL EXAMINATION:  Visit Vitals    /78 (BP 1 Location: Left arm, BP Patient Position: Sitting)    Pulse (!) 53    Temp 97.7 °F (36.5 °C) (Tympanic)    Wt 228 lb (103.4 kg)    SpO2 99%    BMI 28.5 kg/m2     General: No acute distress. Eyes: Sclera anicteric. ENT: No oral lesions. Thyroid normal.  Nodes: No adenopathy. Skin: No spider angiomata. No jaundice. No palmar erythema. Respiratory: Lungs clear to auscultation. Cardiovascular: Regular heart rate. Grade I/VI holosystolic murmur. No JVD. Abdomen: Soft non-tender. Liver palpable 2-3 cm BCM. No obvious ascites. Well-healed umbilical hernia scar. Extremities: No edema. No muscle wasting. No gross arthritic changes. Neurologic: Alert and oriented. Cranial nerves grossly intact. No asterixis.     LABORATORY STUDIES:  Liver [place] of [address] Units 3/8/2018 2017 2017   WBC 3.4 - 10.8 x10E3/uL 5.9 5.3 5.9   ANC 1.8 - 8.0 K/UL      HGB 13.0 - 17.7 g/dL 15.1 14.8 14.9    - 379 x10E3/uL 174 159 162   INR 0.8 - 1.2 1.3 (H) 1.3 (H) 1.3 (H)   AST 0 - 40 IU/L 43 (H) 42 (H) 51 (H)   ALT 0 - 44 IU/L 31 26 31   Alk Phos 39 - 117 IU/L 95 91 97   Bili, Total 0.0 - 1.2 mg/dL 1.5 (H) 0.9 1.1   Bili, Direct 0.00 - 0.40 mg/dL 0.50 (H) 0.32 0.40   Albumin 3.6 - 4.8 g/dL 3.9 3.8 4.1   BUN 8 - 27 mg/dL 6 (L) 7 (L) 6 (L)   Creat 0.76 - 1.27 mg/dL 0.78 0.82 0.65 (L)   Na 134 - 144 mmol/L 130 (L) 132 (L) 132 (L)   K 3.5 - 5.2 mmol/L 4.6 4.6 4.1   Cl 96 - 106 mmol/L 96 98 95 (L)   CO2 18 - 29 mmol/L 23 23 22   Glucose 65 - 99 mg/dL 88 94 101 (H)   Additional lab values drawn at today's office visit are pending at the time of documentation. SEROLOGIES:  Serologies Latest Ref Rng 9/25/2015   Hep A Ab, Total NEGATIVE   NEGATIVE   Hep B Core Ab, Total NEGATIVE   NEGATIVE   Ferritin 26 - 388 NG/ (H)   Iron % Saturation 20 - 50 % 27   Alpha-1 antitrypsin level 90 - 200 mg/dL 165   9/2015. HBsurface antigen negative, Anti-HBsurface negative, anti-HCV negative. LIVER HISTOLOGY:  6/2017. FibroScan performed at [address]. EkPa was 17. Suggested fibrosis level is F3. ENDOSCOPIC PROCEDURES:  9/2015. EGD performed by [facility]. Medium esophageal varices. Banding performed. Mild portal gastropathy. RADIOLOGY:  9/2015. Ultrasound of liver. Echogenic consistent with cirrhosis. No liver mass lesions. No dilated bile ducts. Severe ascites. 1/2016. CT scan abdomen with and without IV contrast.  Changes consistent with cirrhosis. No liver mass lesions. No dilated bile ducts. Small ascites. 5/2016. Ultrasound of liver. Echogenic consistent with cirrhosis. No liver mass lesions. No dilated bile ducts. No ascites. TIPS patent. 11/2016. Ultrasound of liver. Patent TIPS. No significant ascites. Heterogeneous liver.  5/2017. Ultrasound of liver. Echogenic consistent with cirrhosis. Patent TIPS, no liver mass lesions. No dilated bile ducts. No ascites. 11/2017. Ultrasound of liver. Echogenic consistent with chronic liver disease. Patent TIPS, but with some diminished velocities. No liver mass lesions.   No dilated bile ducts. No ascites. 5/2018. Ultrasound of liver. Echogenic consistent with cirrhosis. No liver mass lesions. No dilated bile ducts. No ascites. TIPS patent, appropriate velocities. OTHER TESTING:  Not available or performed    ASSESSMENT AND PLAN:  Cirrhosis secondary to alcohol. Recent labs show stable liver function dramatically improved since cessation of alcohol. He feels that he is back to his baseline status for strength and stamina. He is doing even better with the recent weight losses. On the basis of the ongoing improving labs, patient had shown some marked improvement and there is no indication for transplant consideration at this time. Ascites and edema have now resolved following placement of TIPS, this is patent on recent US doppler in 5/2018. No indication at present for revision as there is no indication of ascites. Patient has discontinued use of diuretics as of 5/2017 and has had no reaccumulation. I will continue to monitor with US every 6 months. Lower extremity edema has resolved. Esophageal varices without prior bleeding. Varices have been banded. TIPS will treat varices he will not require additional EGD with banding. Follow-up per Dr. Anya Georges. Hepatic encephalopathy has not developed to date. There is no need for treatment with lactulose and/or Xifaxan at this time. No need to restrict dietary protein at this time. Patient is taking OTC Miralax as needed to maintain bowel regularity. Insomnia. Patient notes improvement in symptoms with the addition of trazadone, will continue to use prn. The patient was directed to continue all current medications at the current dosages. There are no contraindications for the patient to take any medications that are necessary for treatment of other medical issues. The patient was counseled regarding alcohol consumption. I have congratulated him on attaining >2 years' sobriety.     Vaccination for viral hepatitis A and B is recommended since the patient has no serologic evidence of previous exposure or vaccination with immunity. [facility] screening has recently been performed and does not suggest [facility] The next liver imaging study will be performed in 11/2018 for US with doppler study, this has been ordered. All of the above issues were discussed with the patient. All questions were answered. The patient expressed a clear understanding of the above. [address] [place] [address] in 6 months with repeat US of the TIPS prior to follow-up.     Brianna Arango PA-C  [facility] [place] of [address] [place] [address], [address] [address]  [address].  [phone]

## 2018-06-08 NOTE — PROGRESS NOTES
1. Have you been to the ER, urgent care clinic since your last visit? Hospitalized since your last visit? No    2. Have you seen or consulted any other health care providers outside of the [address] [place] [address] since your last visit? Include any pap smears or colon screening. No   Chief Complaint   Patient presents with    Follow-up     Visit Vitals    /78 (BP 1 Location: Left arm, BP Patient Position: Sitting)    Pulse (!) 53    Temp 97.7 °F (36.5 °C) (Tympanic)    Wt 228 lb (103.4 kg)    SpO2 99%    BMI 28.5 kg/m2     PHQ over the last two weeks 6/8/2018   Little interest or pleasure in doing things Not at all   Feeling down, depressed or hopeless Not at all   Total Score PHQ 2 0     Learning Assessment 6/8/2018   PRIMARY LEARNER Patient   HIGHEST LEVEL OF EDUCATION - PRIMARY LEARNER  -   BARRIERS PRIMARY LEARNER NONE   CO-LEARNER CAREGIVER No   PRIMARY LANGUAGE ENGLISH   LEARNER PREFERENCE PRIMARY LISTENING   LEARNING SPECIAL TOPICS -   ANSWERED BY patient    RELATIONSHIP SELF     Abuse Screening Questionnaire 6/8/2018   Do you ever feel afraid of your partner? N   Are you in a relationship with someone who physically or mentally threatens you? N   Is it safe for you to go home?  Henri Basurto

## 2018-06-08 NOTE — MR AVS SNAPSHOT
[address] [facility] [phone] [address] [place] [address] 
[phone] Patient: Alma Yip MRN: [id number] VPB:[birth year] Visit Information Date & Time Provider Department Dept. Phone Encounter #  
 6/8/2018 10:00 AM Brooke Goodell, [address] [place] [address] [id number] Follow-up Instructions Return in about 6 months (around 12/8/2018) for kemar. Your Appointments 12/10/2018  9:30 AM  
Follow Up with Brooke Goodell, PA [address] ([facility]) Appt Note: Follow up [address] [facility] [phone] [facility] [address]  
[address] [facility] [address] Upcoming Health Maintenance Date Due DTaP/Tdap/Td series (1 - Tdap) 2/16/1976 ZOSTER VACCINE AGE 60> 12/16/2015 FOBT Q 1 YEAR AGE 50-75 1/19/2017 Influenza Age 5 to Adult 8/1/2018 Allergies as of 6/8/2018  Review Complete On: 6/8/2018 By: Brooke Goodell, PA Severity Noted Reaction Type Reactions Erythromycin High 09/24/2015   Side Effect Other (comments), Rash Headache, n/v, diarrhea Headache, n/v, diarrhea Lipitor [Atorvastatin] High 09/24/2015    Cough, Shortness of Breath Pravastatin High 09/24/2015    Cough, Shortness of Breath Azithromycin Low 01/19/2016    Diarrhea, Palpitations Current Immunizations  Reviewed on 1/20/2016 Name Date Hep A Vaccine 10/8/2015 Hep B Vaccine 11/8/2015 Influenza Vaccine (Quad) PF 9/28/2015  1:14 PM  
 Pneumococcal Polysaccharide (PPSV-23) 9/28/2015  1:15 PM  
  
 Not reviewed this visit You Were Diagnosed With   
  
 Codes Comments Alcoholic cirrhosis of liver without asc[facility] ([facility]    -  Primary ICD-10-CM: K70.30 ICD-9-CM: 571.2 S/P TIPS (transjugular intrahepatic portosystemic shunt)     ICD-10-CM: T31.726 ICD-9-CM: V45.89 Vitals BP Pulse Temp Weight(growth percentile) SpO2 BMI 138/78 (BP 1 Location: Left arm, BP Patient Position: Sitting) (!) 53 97.7 °F (36.5 °C) (Tympanic) 228 lb (103.4 kg) 99% 28.5 kg/m2 Smoking Status Never Smoker BMI and BSA Data Body Mass Index Body Surface Area 28.5 kg/m 2 2.34 m 2 Preferred Pharmacy Pharmacy Name Phone [id number] [place], [place] - [address] AT [facility] [phone] Your Updated Medication List  
  
   
This list is accurate as of 6/8/18 10:13 AM.  Always use your most recent med list.  
  
  
  
  
 losartan 50 mg tablet Commonly known as:  COZAAR Take 50 mg by mouth daily. metoprolol tartrate 25 mg tablet Commonly known as:  LOPRESSOR Take  by mouth two (2) times a day. traMADol 50 mg tablet Commonly known as:  ULTRAM  
Take 50 mg by mouth every six (6) hours as needed for Pain. We Performed the Following AFP WITH AFP-L3% [[id number] Custom] CBC W/O DIFF [86182 CPT(R)] HEPATIC FUNCTION PANEL (6) [[id number] Custom] METABOLIC PANEL, BASIC [93985 CPT(R)] PROTHROMBIN TIME + INR [38797 CPT(R)] Follow-up Instructions Return in about 6 months (around 12/8/2018) for kemar. To-Do List   
 11/08/2018 Imaging:  DUPLEX ABD VISC ART/MICHAEL/ORGANS COMPLETE Introducing [facility]! Dear Karla Granados: Thank you for requesting a [employer] account. Our records indicate that you already have an active [employer] account. You can access your account anytime at [email or web address] Did you know that you can access your hospital and ER discharge instructions at any time in [employer]? You can also review all of your test results from your hospital stay or ER visit. Additional Information If you have questions, please visit the Frequently Asked Questions section of the [employer] website at [email or web address] Remember, MyChart is NOT to be used for urgent needs. For medical emergencies, dial 911. Now available from your iPhone and Android! Please provide this summary of care documentation to your next provider. Your primary care clinician is listed as Darryle Childes. If you have any questions after today's visit, please call [phone].

## 2018-06-09 LAB
ALBUMIN SERPL-MCNC: 3.6 G/DL (ref 3.6–4.8)
ALP SERPL-CCNC: 96 IU/L (ref 39–117)
ALT SERPL-CCNC: 25 IU/L (ref 0–44)
AST SERPL-CCNC: 40 IU/L (ref 0–40)
BILIRUB DIRECT SERPL-MCNC: 0.4 MG/DL (ref 0–0.4)
BILIRUB SERPL-MCNC: 1.1 MG/DL (ref 0–1.2)
BUN SERPL-MCNC: 6 MG/DL (ref 8–27)
BUN/CREAT SERPL: 9 (ref 10–24)
CALCIUM SERPL-MCNC: 9.4 MG/DL (ref 8.6–10.2)
CHLORIDE SERPL-SCNC: 96 MMOL/L (ref 96–106)
CO2 SERPL-SCNC: 24 MMOL/L (ref 18–29)
CREAT SERPL-MCNC: 0.65 MG/DL (ref 0.76–1.27)
ERYTHROCYTE [DISTWIDTH] IN BLOOD BY AUTOMATED COUNT: 13.8 % (ref 12.3–15.4)
GFR SERPLBLD CREATININE-BSD FMLA CKD-EPI: 104 ML/MIN/1.73
GFR SERPLBLD CREATININE-BSD FMLA CKD-EPI: 121 ML/MIN/1.73
GLUCOSE SERPL-MCNC: 84 MG/DL (ref 65–99)
HCT VFR BLD AUTO: 41.1 % (ref 37.5–51)
HGB BLD-MCNC: 14.5 G/DL (ref 13–17.7)
INR PPP: 1.2 (ref 0.8–1.2)
MCH RBC QN AUTO: 32.5 PG (ref 26.6–33)
MCHC RBC AUTO-ENTMCNC: 35.3 G/DL (ref 31.5–35.7)
MCV RBC AUTO: 92 FL (ref 79–97)
PLATELET # BLD AUTO: 152 X10E3/UL (ref 150–379)
POTASSIUM SERPL-SCNC: 4.8 MMOL/L (ref 3.5–5.2)
PROTHROMBIN TIME: 12.8 SEC (ref 9.1–12)
RBC # BLD AUTO: 4.46 X10E6/UL (ref 4.14–5.8)
SODIUM SERPL-SCNC: 132 MMOL/L (ref 134–144)
WBC # BLD AUTO: 5.5 X10E3/UL (ref 3.4–10.8)

## 2018-06-14 LAB
AFP L3 MFR SERPL: NORMAL % (ref 0–9.9)
AFP SERPL-MCNC: 2 NG/ML (ref 0–8)

## 2018-11-08 ENCOUNTER — HOSPITAL ENCOUNTER (OUTPATIENT)
Dept: ULTRASOUND IMAGING | Age: 63
Discharge: HOME | End: 2018-11-08
Attending: PHYSICIAN ASSISTANT
Payer: COMMERCIAL

## 2018-11-08 DIAGNOSIS — K70.30 ALCOHOLIC CIRRHOSIS OF LIVER WITHOUT ASCITES (HCC): ICD-10-CM

## 2018-11-08 DIAGNOSIS — Z95.828 S/P TIPS (TRANSJUGULAR INTRAHEPATIC PORTOSYSTEMIC SHUNT): ICD-10-CM

## 2018-11-08 PROCEDURE — 93975 VASCULAR STUDY: CPT

## 2018-12-12 ENCOUNTER — OFFICE VISIT (OUTPATIENT)
Dept: HEMATOLOGY | Age: 63
End: 2018-12-12

## 2018-12-12 VITALS
BODY MASS INDEX: 29.75 KG/M2 | DIASTOLIC BLOOD PRESSURE: 89 MMHG | OXYGEN SATURATION: 98 % | HEART RATE: 55 BPM | WEIGHT: 238 LBS | TEMPERATURE: 97.9 F | SYSTOLIC BLOOD PRESSURE: 165 MMHG

## 2018-12-12 DIAGNOSIS — K70.30 ALCOHOLIC CIRRHOSIS OF LIVER WITHOUT ASCITES (HCC): Primary | ICD-10-CM

## 2018-12-12 DIAGNOSIS — Z95.828 S/P TIPS (TRANSJUGULAR INTRAHEPATIC PORTOSYSTEMIC SHUNT): ICD-10-CM

## 2018-12-12 LAB
ERYTHROCYTE [DISTWIDTH] IN BLOOD BY AUTOMATED COUNT: 13.3 % (ref 12.3–15.4)
HCT VFR BLD AUTO: 39.8 % (ref 37.5–51)
HGB BLD-MCNC: 14.3 G/DL (ref 13–17.7)
MCH RBC QN AUTO: 33.4 PG (ref 26.6–33)
MCHC RBC AUTO-ENTMCNC: 35.9 G/DL (ref 31.5–35.7)
MCV RBC AUTO: 93 FL (ref 79–97)
PLATELET # BLD AUTO: 193 X10E3/UL (ref 150–379)
RBC # BLD AUTO: 4.28 X10E6/UL (ref 4.14–5.8)
WBC # BLD AUTO: 4.4 X10E3/UL (ref 3.4–10.8)

## 2018-12-12 NOTE — PROGRESS NOTES
1. Have you been to the ER, urgent care clinic since your last visit? Hospitalized since your last visit? No    2. Have you seen or consulted any other health care providers outside of the [address] [place] [address] since your last visit? Include any pap smears or colon screening. No   Chief Complaint   Patient presents with    Follow-up     Visit Vitals  /89 (BP 1 Location: Left arm, BP Patient Position: Sitting)   Pulse (!) 55   Temp 97.9 °F (36.6 °C) (Tympanic)   Wt 238 lb (108 kg)   SpO2 98%   BMI 29.75 kg/m²     PHQ over the last two weeks 12/12/2018   Little interest or pleasure in doing things Not at all   Feeling down, depressed, irritable, or hopeless Not at all   Total Score PHQ 2 0     Learning Assessment 6/8/2018   PRIMARY LEARNER Patient   HIGHEST LEVEL OF EDUCATION - PRIMARY LEARNER  -   BARRIERS PRIMARY LEARNER NONE   CO-LEARNER CAREGIVER No   PRIMARY LANGUAGE ENGLISH   LEARNER PREFERENCE PRIMARY LISTENING   LEARNING SPECIAL TOPICS -   ANSWERED BY patient    RELATIONSHIP SELF     Abuse Screening Questionnaire 12/12/2018   Do you ever feel afraid of your partner? N   Are you in a relationship with someone who physically or mentally threatens you? N   Is it safe for you to go home? Y     Fall Risk Assessment, last 12 mths 12/12/2018   Able to walk? Yes   Fall in past 12 months?  No

## 2018-12-12 NOTE — PROGRESS NOTES
[facility], MD, FACP, Cite Rene Valente     April David Gagnon, ROSI Meléndez MD, Olive Garcia MD     [facility] [place], AWAIS Santos NP        [address], [address], Stephanie  22.     [phone]     FAX: [address] [place] [address] Drive, [address], [address]     [phone]     FAX: [phone]       Patient Care Team:  Brendon Raymundo MD as PCP - General ([facility])  Brendon Raymundo MD (General Practice)      Problem List  Date Reviewed: 6/8/2018          Codes Class Noted    Ascites ICD-10-CM: R18.8  ICD-9-CM: 789.59  12/26/2015        S/P TIPS (transjugular intrahepatic portosystemic shunt) ICD-10-CM: B09.472  ICD-9-CM: V45.89  12/26/2015        Cirrhosis, alcoholic ([facility] [id number]: K70.30  ICD-9-CM: 571.2  10/6/2015        Hyponatremia ICD-10-CM: E87.1  ICD-9-CM: 276.1  9/24/2015               Wendy Bowens returns to the [address] for management of 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 The active problem list, all pertinent past medical history, medications, endoscopic studies, radiologic findings and laboratory findings related to the liver disorder were reviewed with the patient. The patient is a 59 y.o.  male who was first found to have cirrhosis in 9/2015 when developed ascites. Ascites became refractory to diuretics because of hyponatremia. The patient underwent TIPS for treatment of refactory ascites in 12/2015. He has done extremely since placement of the TIPS and has had no complication of this procedure. Edema has resolved with diuretics. Patient has done so well with resolution of edema that he discontinued use of all diuretics as of ~5/2017.   He has noted no increase in fluid and has had an improvement in muscle cramping symptoms and subsequent improved sleeping patterns. The patient has esophageal varices without bleeding. Varices have been treated with band ligation and now TIPS. Recent US of the liver in 11/2018 showed that this was patent and the liver without mass. The patient has had significant intentional weight loss of ~30# in the past year. He is doing this largely through calorie restrictions and states that he is feeling better now than he has in many years. He has had some recent gains, but is working toward a target weight of 215-20#. The patient completes all daily activities without any functional limitations. The patient has not experienced fatigue, fevers, chills, pain in the right side over the liver, problems concentrating, swelling of the abdomen, swelling of the lower extremities, hematemesis, hematochezia. ALLERGIES  Allergies   Allergen Reactions    Erythromycin Other (comments) and Rash     Headache, n/v, diarrhea  Headache, n/v, diarrhea      Lipitor [Atorvastatin] Cough and Shortness of Breath    Pravastatin Cough and Shortness of Breath    Azithromycin Diarrhea and Palpitations       MEDICATIONS  Current Outpatient Medications   Medication Sig    traMADol (ULTRAM) 50 mg tablet Take 50 mg by mouth every six (6) hours as needed for Pain.  metoprolol tartrate (LOPRESSOR) 25 mg tablet Take  by mouth two (2) times a day.  losartan (COZAAR) 50 mg tablet Take 50 mg by mouth daily. No current facility-administered medications for this visit. SYSTEM REVIEW NOT RELATED TO LIVER DISEASE OR REVIEWED ABOVE:  Constitution systems: Negative for fever, chills, weight loss. Weight up a bit from last visit. Eyes: Negative for visual changes. ENT: Negative for sore throat, painful swallowing. Respiratory: Negative for cough, hemoptysis, SOB. Cardiology: Negative for chest pain, palpitations. GI:  Negative for constipation or diarrhea.   : Negative for urinary frequency, dysuria, hematuria, nocturia. Skin: Negative for rash. Well-healed umbilical scar. Hematology: Negative for easy bruising, blood clots. Musculo-skeletal: Negative for back pain, muscle pain, weakness. Neurologic: Negative for headaches, dizziness, vertigo, memory problems not related to HE. Psychology: Negative for anxiety, depression. FAMILY HISTORY:  The father  of head and neck CA. The mother  of brain cancer. There is no family history of liver disease. SOCIAL HISTORY:  The patient is . The patient has 1 child and 1 grandchild. The patient has never used tobacco products. The patient previously consumed 4-6 alcoholic beverages per day. He states that he has had no alcohol since late 2015. The patient used to work as an  for [employer] at [employer]. The patient retired in 2015. PHYSICAL EXAMINATION:  Visit Vitals  /89 (BP 1 Location: Left arm, BP Patient Position: Sitting)   Pulse (!) 55   Temp 97.9 °F (36.6 °C) (Tympanic)   Wt 238 lb (108 kg)   SpO2 98%   BMI 29.75 kg/m²     General: No acute distress. Eyes: Sclera anicteric. ENT: No oral lesions. Thyroid normal.  Nodes: No adenopathy. Skin: No spider angiomata. No jaundice. No palmar erythema. Positive for onychomycosis x 3 fingers. Respiratory: Lungs clear to auscultation. Cardiovascular: Regular heart rate. Grade I/VI holosystolic murmur. No JVD. Abdomen: Soft non-tender. Liver palpable 2-3 cm BCM. No obvious ascites. Well-healed umbilical hernia scar. Extremities: No edema. No muscle wasting. No gross arthritic changes. Neurologic: Alert and oriented. Cranial nerves grossly intact. No asterixis.     LABORATORY STUDIES:  Liver [place] of [address] Units 2018 3/8/2018 2017   WBC 3.4 - 10.8 x10E3/uL 5.5 5.9 5.3   ANC 1.8 - 8.0 K/UL      HGB 13.0 - 17.7 g/dL 14.5 15.1 14.8    - 379 x10E3/uL 152 174 159   INR 0.8 - 1.2 1.2 1.3 (H) 1.3 (H) AST 0 - 40 IU/L 40 43 (H) 42 (H)   ALT 0 - 44 IU/L 25 31 26   Alk Phos 39 - 117 IU/L 96 95 91   Bili, Total 0.0 - 1.2 mg/dL 1.1 1.5 (H) 0.9   Bili, Direct 0.00 - 0.40 mg/dL 0.40 0.50 (H) 0.32   Albumin 3.6 - 4.8 g/dL 3.6 3.9 3.8   BUN 8 - 27 mg/dL 6 (L) 6 (L) 7 (L)   Creat 0.76 - 1.27 mg/dL 0.65 (L) 0.78 0.82   Na 134 - 144 mmol/L 132 (L) 130 (L) 132 (L)   K 3.5 - 5.2 mmol/L 4.8 4.6 4.6   Cl 96 - 106 mmol/L 96 96 98   CO2 18 - 29 mmol/L 24 23 23   Glucose 65 - 99 mg/dL 84 88 94     Cancer Screening Latest Ref Rng & Units 6/8/2018 3/8/2018 11/8/2017   AFP, Serum 0.0 - 8.0 ng/mL 2.0 1.6 2.3   AFP-L3% 0.0 - 9.9 % Comment Comment Comment   Additional lab values drawn at today's office visit are pending at the time of documentation. SEROLOGIES:  Serologies Latest Ref Rng 9/25/2015   Hep A Ab, Total NEGATIVE   NEGATIVE   Hep B Core Ab, Total NEGATIVE   NEGATIVE   Ferritin 26 - 388 NG/ (H)   Iron % Saturation 20 - 50 % 27   Alpha-1 antitrypsin level 90 - 200 mg/dL 165   9/2015. HBsurface antigen negative, Anti-HBsurface negative, anti-HCV negative. LIVER HISTOLOGY:  6/2017. FibroScan performed at [facility]. EkPa was 17. Suggested fibrosis level is F3. ENDOSCOPIC PROCEDURES:  9/2015. EGD performed by MLS. Medium esophageal varices. Banding performed. Mild portal gastropathy. RADIOLOGY:  9/2015. Ultrasound of liver. Echogenic consistent with cirrhosis. No liver mass lesions. No dilated bile ducts. Severe ascites. 1/2016. CT scan abdomen with and without IV contrast.  Changes consistent with cirrhosis. No liver mass lesions. No dilated bile ducts. Small ascites. 5/2016. Ultrasound of liver. Echogenic consistent with cirrhosis. No liver mass lesions. No dilated bile ducts. No ascites. TIPS patent. 11/2016. Ultrasound of liver. Patent TIPS. No significant ascites. Heterogeneous liver.  5/2017. Ultrasound of liver. Echogenic consistent with cirrhosis. Patent TIPS, no liver mass lesions. No dilated bile ducts. No ascites. 11/2017. Ultrasound of liver. Echogenic consistent with chronic liver disease. Patent TIPS, but with some diminished velocities. No liver mass lesions. No dilated bile ducts. No ascites. 5/2018. Ultrasound of liver. Echogenic consistent with cirrhosis. No liver mass lesions. No dilated bile ducts. No ascites. TIPS patent, appropriate velocities. 11/2018. Ultrasound of liver. Echogenic consistent with cirrhosis. No liver mass lesions. No dilated bile ducts. No ascites. TIPS patent, appropriate velocities. OTHER TESTING:  Not available or performed    ASSESSMENT AND PLAN:  Cirrhosis secondary to alcohol. Recent labs show stable liver function dramatically improved since cessation of alcohol. He feels that he is back to his baseline status for strength and stamina. He is doing even better with the recent weight losses. On the basis of the ongoing improving labs, patient had shown some marked improvement and there is no indication for transplant consideration at this time. Ascites and edema have now resolved following placement of TIPS, this is patent on recent US doppler in 11/2018. No indication at present for revision as there is no indication of ascites. Patient has discontinued use of diuretics as of 5/2017 and has had no reaccumulation. I will continue to monitor with US every 6 months. Lower extremity edema has resolved. Esophageal varices without prior bleeding. Varices have been banded. TIPS will treat varices he will not require additional EGD with banding. Follow-up per Dr. Mendy Larsen. Hepatic encephalopathy has not developed to date. There is no need for treatment with lactulose and/or Xifaxan at this time. No need to restrict dietary protein at this time. Patient is taking OTC Miralax as needed to maintain bowel regularity.     The patient was directed to continue all current medications at the current dosages. There are no contraindications for the patient to take any medications that are necessary for treatment of other medical issues. The patient was counseled regarding alcohol consumption. I have congratulated him on attaining >3 years' sobriety. Vaccination for viral hepatitis A and B is recommended since the patient has no serologic evidence of previous exposure or vaccination with immunity. [facility] screening has recently been performed and does not suggest [address] The next liver imaging study will be performed in 6/2019 for US with doppler study, this has been ordered. All of the above issues were discussed with the patient. All questions were answered. The patient expressed a clear understanding of the above. [address] [place] [address] 87 in 6 months with repeat US of the TIPS prior to follow-up.     Rashaad Calvo PA-C  Liver [place] [address], [address] [place]  22.  [phone]

## 2018-12-13 LAB
ALBUMIN SERPL-MCNC: 3.9 G/DL (ref 3.6–4.8)
ALP SERPL-CCNC: 69 IU/L (ref 39–117)
ALT SERPL-CCNC: 31 IU/L (ref 0–44)
AST SERPL-CCNC: 53 IU/L (ref 0–40)
BILIRUB DIRECT SERPL-MCNC: 0.43 MG/DL (ref 0–0.4)
BILIRUB SERPL-MCNC: 1.4 MG/DL (ref 0–1.2)
BUN SERPL-MCNC: 7 MG/DL (ref 8–27)
BUN/CREAT SERPL: 10 (ref 10–24)
CALCIUM SERPL-MCNC: 9.2 MG/DL (ref 8.6–10.2)
CHLORIDE SERPL-SCNC: 97 MMOL/L (ref 96–106)
CO2 SERPL-SCNC: 23 MMOL/L (ref 20–29)
CREAT SERPL-MCNC: 0.69 MG/DL (ref 0.76–1.27)
GLUCOSE SERPL-MCNC: 88 MG/DL (ref 65–99)
INR PPP: 1.3 (ref 0.8–1.2)
POTASSIUM SERPL-SCNC: 4.4 MMOL/L (ref 3.5–5.2)
PROTHROMBIN TIME: 13.4 SEC (ref 9.1–12)
SODIUM SERPL-SCNC: 130 MMOL/L (ref 134–144)

## 2018-12-14 LAB
AFP L3 MFR SERPL: NORMAL % (ref 0–9.9)
AFP SERPL-MCNC: 2.1 NG/ML (ref 0–8)

## 2019-06-03 ENCOUNTER — HOSPITAL ENCOUNTER (OUTPATIENT)
Dept: ULTRASOUND IMAGING | Age: 64
Discharge: HOME | End: 2019-06-03
Attending: PHYSICIAN ASSISTANT
Payer: COMMERCIAL

## 2019-06-03 DIAGNOSIS — K70.30 ALCOHOLIC CIRRHOSIS OF LIVER WITHOUT ASCITES (HCC): ICD-10-CM

## 2019-06-03 DIAGNOSIS — Z95.828 S/P TIPS (TRANSJUGULAR INTRAHEPATIC PORTOSYSTEMIC SHUNT): ICD-10-CM

## 2019-06-03 PROCEDURE — 93975 VASCULAR STUDY: CPT

## 2019-06-03 PROCEDURE — 93976 VASCULAR STUDY: CPT

## 2019-06-12 ENCOUNTER — OFFICE VISIT (OUTPATIENT)
Dept: HEMATOLOGY | Age: 64
End: 2019-06-12

## 2019-06-12 VITALS
SYSTOLIC BLOOD PRESSURE: 128 MMHG | HEART RATE: 56 BPM | DIASTOLIC BLOOD PRESSURE: 67 MMHG | BODY MASS INDEX: 30.35 KG/M2 | TEMPERATURE: 99 F | OXYGEN SATURATION: 98 % | WEIGHT: 242.8 LBS

## 2019-06-12 DIAGNOSIS — K70.30 ALCOHOLIC CIRRHOSIS OF LIVER WITHOUT ASCITES (HCC): Primary | ICD-10-CM

## 2019-06-12 DIAGNOSIS — Z95.828 S/P TIPS (TRANSJUGULAR INTRAHEPATIC PORTOSYSTEMIC SHUNT): ICD-10-CM

## 2019-06-12 NOTE — PROGRESS NOTES
3340 [facility], MD, MD Riddhi Mohamud, ROSI Hirsch, [facility]     April Rhett Emerson, AWAIS Swan, AWAIS Bowman, AWAIS [facility]    at [address], [address] [place]  22.    [phone]    FAX: [address] [place] [address], [address]    [phone]    FAX: [phone]       Patient Care Team:  Audrey Osorio MD as PCP - General ([facility])  Audrey Osorio MD (General Practice)      Problem List  Date Reviewed: 12/12/2018          Codes Class Noted    Ascites ICD-10-CM: R18.8  ICD-9-CM: 789.59  12/26/2015        S/P TIPS (transjugular intrahepatic portosystemic shunt) ICD-10-CM: A14.724  ICD-9-CM: V45.89  12/26/2015        Cirrhosis, alcoholic ([facility] DYP-01-RZ: K70.30  ICD-9-CM: 571.2  10/6/2015        Hyponatremia ICD-10-CM: E87.1  ICD-9-CM: 276.1  9/24/2015               Laina Ballesteros returns to the [address] for management of 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 The active problem list, all pertinent past medical history, medications, endoscopic studies, radiologic findings and laboratory findings related to the liver disorder were reviewed with the patient. The patient is a 62 y.o.  male who was first found to have cirrhosis in 9/2015 when developed ascites. Ascites became refractory to diuretics because of hyponatremia. The patient underwent TIPS for treatment of refactory ascites in 12/2015. He has done extremely since placement of the TIPS and has had no complication of this procedure. Edema has resolved with diuretics.   Patient has done so well with resolution of edema that he discontinued use of all diuretics as of ~5/2017. He has noted no increase in fluid and has had an improvement in muscle cramping symptoms and subsequent improved sleeping patterns. The patient has esophageal varices without bleeding. Varices have been treated with band ligation and now TIPS. Recent US of the liver in 6/2019 showed that this was patent and the liver without mass. The patient's weight has remained elevated for much of the past year. I have encouraged him to target weight loss as a goal. He is working toward a target weight of 215-20#. The patient completes all daily activities without any functional limitations. The patient has not experienced fatigue, fevers, chills, pain in the right side over the liver, problems concentrating, swelling of the abdomen, swelling of the lower extremities, hematemesis, hematochezia. ALLERGIES  Allergies   Allergen Reactions    Erythromycin Other (comments) and Rash     Headache, n/v, diarrhea  Headache, n/v, diarrhea      Lipitor [Atorvastatin] Cough and Shortness of Breath    Pravastatin Cough and Shortness of Breath    Azithromycin Diarrhea and Palpitations       MEDICATIONS  Current Outpatient Medications   Medication Sig    traMADol (ULTRAM) 50 mg tablet Take 50 mg by mouth every six (6) hours as needed for Pain.  metoprolol tartrate (LOPRESSOR) 25 mg tablet Take  by mouth two (2) times a day.  losartan (COZAAR) 50 mg tablet Take 50 mg by mouth daily. No current facility-administered medications for this visit. SYSTEM REVIEW NOT RELATED TO LIVER DISEASE OR REVIEWED ABOVE:  Constitution systems: Negative for fever, chills, weight loss. Weight up a bit from last visit. Eyes: Negative for visual changes. ENT: Negative for sore throat, painful swallowing. Respiratory: Negative for cough, hemoptysis, SOB. Cardiology: Negative for chest pain, palpitations. GI:  Negative for constipation or diarrhea.   : Negative for urinary frequency, dysuria, hematuria, nocturia. Skin: Negative for rash. Well-healed umbilical scar. Hematology: Negative for easy bruising, blood clots. Musculo-skeletal: Negative for back pain, muscle pain, weakness. Neurologic: Negative for headaches, dizziness, vertigo, memory problems not related to HE. Psychology: Negative for anxiety, depression. FAMILY HISTORY:  The father  of head and neck CA. The mother  of brain cancer. There is no family history of liver disease. SOCIAL HISTORY:  The patient is . The patient has 1 child and 1 grandchild. The patient has never used tobacco products. The patient previously consumed 4-6 alcoholic beverages per day. He states that he has had no alcohol since late 2015. The patient used to work as an  for [employer] at [employer]. The patient retired in 2015. PHYSICAL EXAMINATION:  Visit Vitals  /67 (BP 1 Location: Left arm, BP Patient Position: Sitting)   Pulse (!) 56   Temp 99 °F (37.2 °C) (Tympanic)   Wt 242 lb 12.8 oz (110.1 kg)   SpO2 98%   BMI 30.35 kg/m²     General: No acute distress. Eyes: Sclera anicteric. ENT: No oral lesions. Thyroid normal.  Nodes: No adenopathy. Skin: No spider angiomata. No jaundice. No palmar erythema. Positive for onychomycosis x 3 fingers. Respiratory: Lungs clear to auscultation. Cardiovascular: Regular heart rate. Grade I/VI holosystolic murmur. No JVD. Abdomen: Soft non-tender. Liver palpable 2-3 cm BCM. No obvious ascites. Well-healed umbilical hernia scar. Extremities: No edema. No muscle wasting. No gross arthritic changes. Neurologic: Alert and oriented. Cranial nerves grossly intact. No asterixis.     LABORATORY STUDIES:  Liver [place] of [address] Units 2018 2018 3/8/2018   WBC 3.4 - 10.8 x10E3/uL 4.4 5.5 5.9   ANC 1.8 - 8.0 K/UL      HGB 13.0 - 17.7 g/dL 14.3 14.5 15.1    - 379 x10E3/uL 193 152 174   INR 0.8 - 1.2 1.3 (H) 1.2 1.3 (H)   AST 0 - 40 IU/L 53 (H) 40 43 (H)   ALT 0 - 44 IU/L 31 25 31   Alk Phos 39 - 117 IU/L 69 96 95   Bili, Total 0.0 - 1.2 mg/dL 1.4 (H) 1.1 1.5 (H)   Bili, Direct 0.00 - 0.40 mg/dL 0.43 (H) 0.40 0.50 (H)   Albumin 3.6 - 4.8 g/dL 3.9 3.6 3.9   BUN 8 - 27 mg/dL 7 (L) 6 (L) 6 (L)   Creat 0.76 - 1.27 mg/dL 0.69 (L) 0.65 (L) 0.78   Na 134 - 144 mmol/L 130 (L) 132 (L) 130 (L)   K 3.5 - 5.2 mmol/L 4.4 4.8 4.6   Cl 96 - 106 mmol/L 97 96 96   CO2 20 - 29 mmol/L 23 24 23   Glucose 65 - 99 mg/dL 88 84 88     Cancer Screening Latest Ref Rng & Units 12/12/2018 6/8/2018 3/8/2018   AFP, Serum 0.0 - 8.0 ng/mL 2.1 2.0 1.6   AFP-L3% 0.0 - 9.9 % Comment Comment Comment   Additional lab values drawn at today's office visit are pending at the time of documentation. SEROLOGIES:  Serologies Latest Ref Rng 9/25/2015   Hep A Ab, Total NEGATIVE   NEGATIVE   Hep B Core Ab, Total NEGATIVE   NEGATIVE   Ferritin 26 - 388 NG/ (H)   Iron % Saturation 20 - 50 % 27   Alpha-1 antitrypsin level 90 - 200 mg/dL 165   9/2015. HBsurface antigen negative, Anti-HBsurface negative, anti-HCV negative. LIVER HISTOLOGY:  6/2017. FibroScan performed at [facility]. EkPa was 17. Suggested fibrosis level is F3. ENDOSCOPIC PROCEDURES:  9/2015. EGD performed by MLS. Medium esophageal varices. Banding performed. Mild portal gastropathy. RADIOLOGY:  9/2015. Ultrasound of liver. Echogenic consistent with cirrhosis. No liver mass lesions. No dilated bile ducts. Severe ascites. 1/2016. CT scan abdomen with and without IV contrast.  Changes consistent with cirrhosis. No liver mass lesions. No dilated bile ducts. Small ascites. 5/2016. Ultrasound of liver. Echogenic consistent with cirrhosis. No liver mass lesions. No dilated bile ducts. No ascites. TIPS patent. 11/2016. Ultrasound of liver. Patent TIPS. No significant ascites. Heterogeneous liver.  5/2017. Ultrasound of liver.    Echogenic consistent with cirrhosis. Patent TIPS, no liver mass lesions. No dilated bile ducts. No ascites. 11/2017. Ultrasound of liver. Echogenic consistent with chronic liver disease. Patent TIPS, but with some diminished velocities. No liver mass lesions. No dilated bile ducts. No ascites. 5/2018. Ultrasound of liver. Echogenic consistent with cirrhosis. No liver mass lesions. No dilated bile ducts. No ascites. TIPS patent, appropriate velocities. 11/2018. Ultrasound of liver. Echogenic consistent with cirrhosis. No liver mass lesions. No dilated bile ducts. No ascites. TIPS patent, appropriate velocities. 6/2019. Ultrasound of liver. Echogenic consistent with cirrhosis. No liver mass lesions. No dilated bile ducts. No ascites. TIPS patent, appropriate velocities. OTHER TESTING:  Not available or performed    ASSESSMENT AND PLAN:  Cirrhosis secondary to alcohol. Recent labs show stable liver function dramatically improved since cessation of alcohol. He feels that he is back to his baseline status for strength and stamina. He is doing even better with the recent weight losses. On the basis of the ongoing improving labs, patient had shown some marked improvement and there is no indication for transplant consideration at this time. Ascites and edema have now resolved following placement of TIPS, this is patent on recent US doppler in 6/2019. No indication at present for revision as there is no indication of ascites. Patient has discontinued use of diuretics as of 5/2017 and has had no reaccumulation. I will continue to monitor with US every 6 months. Lower extremity edema has resolved. Esophageal varices without prior bleeding. Varices have been banded. TIPS will treat varices he will not require additional EGD with banding. Follow-up per Dr. Denzel Nathan. Hepatic encephalopathy has not developed to date.   There is no need for treatment with lactulose and/or Xifaxan at this time. No need to restrict dietary protein at this time. Patient is taking OTC Miralax as needed to maintain bowel regularity. The patient was directed to continue all current medications at the current dosages. There are no contraindications for the patient to take any medications that are necessary for treatment of other medical issues. The patient was counseled regarding alcohol consumption. I have congratulated him on attaining >3 years' sobriety. Vaccination for viral hepatitis A and B is recommended since the patient has no serologic evidence of previous exposure or vaccination with immunity. [address] screening has recently been performed and does not suggest [address] The next liver imaging study will be performed in 6/2019 for US with doppler study, this has been ordered. All of the above issues were discussed with the patient. All questions were answered. The patient expressed a clear understanding of the above. [address] [address] in 6 months with repeat US of the TIPS prior to follow-up.     Disha Gallegos PA-C  Liver [place] of [address] [place] [address], [address] [address] [place]  22.  [phone]

## 2019-06-12 NOTE — PROGRESS NOTES
1. Have you been to the ER, urgent care clinic since your last visit? Hospitalized since your last visit? No     2. Have you seen or consulted any other health care providers outside of the [address] [place] [address] since your last visit? Include any pap smears or colon screening. No  Chief Complaint   Patient presents with    Follow-up     Visit Vitals  /67 (BP 1 Location: Left arm, BP Patient Position: Sitting)   Pulse (!) 56   Temp 99 °F (37.2 °C) (Tympanic)   Wt 242 lb 12.8 oz (110.1 kg)   SpO2 98%   BMI 30.35 kg/m²     3 most recent PHQ Screens 6/12/2019   Little interest or pleasure in doing things Not at all   Feeling down, depressed, irritable, or hopeless Not at all   Total Score PHQ 2 0     Learning Assessment 6/12/2019   PRIMARY LEARNER Patient   HIGHEST LEVEL OF EDUCATION - PRIMARY LEARNER  -   BARRIERS PRIMARY LEARNER NONE   CO-LEARNER CAREGIVER No   PRIMARY LANGUAGE ENGLISH   LEARNER PREFERENCE PRIMARY LISTENING   LEARNING SPECIAL TOPICS -   ANSWERED BY patient   RELATIONSHIP SELF     Abuse Screening Questionnaire 6/12/2019   Do you ever feel afraid of your partner? N   Are you in a relationship with someone who physically or mentally threatens you? N   Is it safe for you to go home? Y     Fall Risk Assessment, last 12 mths 6/12/2019   Able to walk? Yes   Fall in past 12 months?  No

## 2019-06-13 LAB
AFP L3 MFR SERPL: NORMAL % (ref 0–9.9)
AFP SERPL-MCNC: 1.8 NG/ML (ref 0–8)
ALBUMIN SERPL-MCNC: 3.9 G/DL (ref 3.6–4.8)
ALP SERPL-CCNC: 84 IU/L (ref 39–117)
ALT SERPL-CCNC: 26 IU/L (ref 0–44)
AST SERPL-CCNC: 39 IU/L (ref 0–40)
BILIRUB DIRECT SERPL-MCNC: 0.46 MG/DL (ref 0–0.4)
BILIRUB SERPL-MCNC: 1.7 MG/DL (ref 0–1.2)
BUN SERPL-MCNC: 5 MG/DL (ref 8–27)
BUN/CREAT SERPL: 8 (ref 10–24)
CALCIUM SERPL-MCNC: 9 MG/DL (ref 8.6–10.2)
CHLORIDE SERPL-SCNC: 96 MMOL/L (ref 96–106)
CO2 SERPL-SCNC: 23 MMOL/L (ref 20–29)
CREAT SERPL-MCNC: 0.65 MG/DL (ref 0.76–1.27)
ERYTHROCYTE [DISTWIDTH] IN BLOOD BY AUTOMATED COUNT: 13.4 % (ref 12.3–15.4)
GLUCOSE SERPL-MCNC: 85 MG/DL (ref 65–99)
HCT VFR BLD AUTO: 44.9 % (ref 37.5–51)
HGB BLD-MCNC: 15.3 G/DL (ref 13–17.7)
INR PPP: 1.3 (ref 0.8–1.2)
MCH RBC QN AUTO: 31.4 PG (ref 26.6–33)
MCHC RBC AUTO-ENTMCNC: 34.1 G/DL (ref 31.5–35.7)
MCV RBC AUTO: 92 FL (ref 79–97)
PLATELET # BLD AUTO: 164 X10E3/UL (ref 150–450)
POTASSIUM SERPL-SCNC: 4.5 MMOL/L (ref 3.5–5.2)
PROTHROMBIN TIME: 13.4 SEC (ref 9.1–12)
RBC # BLD AUTO: 4.87 X10E6/UL (ref 4.14–5.8)
SODIUM SERPL-SCNC: 129 MMOL/L (ref 134–144)
WBC # BLD AUTO: 5.9 X10E3/UL (ref 3.4–10.8)

## 2019-07-24 LAB
TIPS PORTAL VEIN INFLOW PSV: 16.8 CM/S
TIPS STENT HEPATIC END PSV: 90 CM/S
TIPS STENT MID PSV: 54 CM/S
TIPS STENT PORTAL END PSV: 51 CM/S

## 2019-12-12 ENCOUNTER — HOSPITAL ENCOUNTER (OUTPATIENT)
Dept: ULTRASOUND IMAGING | Age: 64
Discharge: HOME | End: 2019-12-12
Attending: PHYSICIAN ASSISTANT
Payer: COMMERCIAL

## 2019-12-12 ENCOUNTER — OFFICE VISIT (OUTPATIENT)
Dept: HEMATOLOGY | Age: 64
End: 2019-12-12

## 2019-12-12 VITALS
HEIGHT: 75 IN | HEART RATE: 57 BPM | SYSTOLIC BLOOD PRESSURE: 160 MMHG | BODY MASS INDEX: 29.72 KG/M2 | WEIGHT: 239 LBS | TEMPERATURE: 97.9 F | DIASTOLIC BLOOD PRESSURE: 82 MMHG

## 2019-12-12 DIAGNOSIS — K70.30 ALCOHOLIC CIRRHOSIS OF LIVER WITHOUT ASCITES (HCC): Primary | ICD-10-CM

## 2019-12-12 DIAGNOSIS — K70.30 ALCOHOLIC CIRRHOSIS OF LIVER WITHOUT ASCITES (HCC): ICD-10-CM

## 2019-12-12 DIAGNOSIS — Z95.828 S/P TIPS (TRANSJUGULAR INTRAHEPATIC PORTOSYSTEMIC SHUNT): ICD-10-CM

## 2019-12-12 PROCEDURE — 93976 VASCULAR STUDY: CPT

## 2019-12-12 PROCEDURE — 76700 US EXAM ABDOM COMPLETE: CPT

## 2019-12-12 NOTE — PROGRESS NOTES
[address] [facility], MD, Bartley Shearer, MD Remigio Mcburney, PA-C Dallie Pu, [facility]     April Zuleyma Albert, AWAIS Mason, AWAIS Wilburn [facility]    at [address], [address] Stephanie Lucas  22.    [phone]    FAX: [address] [place] [address] Drive, [address], [address]    [phone]    FAX: [phone]       Patient Care Team:  Nakita Zuñiga MD as PCP - General ([facility])  Nakita Zuñiga MD (General Practice)      Problem List  Date Reviewed: 6/12/2019          Codes Class Noted    Ascites ICD-10-CM: R18.8  ICD-9-CM: 789.59  12/26/2015        S/P TIPS (transjugular intrahepatic portosystemic shunt) ICD-10-CM: E41.703  ICD-9-CM: V45.89  12/26/2015        Cirrhosis, alcoholic ([facility] [place]-: K70.30  ICD-9-CM: 571.2  10/6/2015        Hyponatremia ICD-10-CM: E87.1  ICD-9-CM: 276.1  9/24/2015               Ernestine Celestin returns to the [facility] for management of 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 The active problem list, all pertinent past medical history, medications, endoscopic studies, radiologic findings and laboratory findings related to the liver disorder were reviewed with the patient. The patient is a 62 y.o.  male who was first found to have cirrhosis in 9/2015 when developed ascites. Ascites became refractory to diuretics because of hyponatremia. The patient underwent TIPS for treatment of refactory ascites in 12/2015. He has done extremely since placement of the TIPS and has had no complication of this procedure. He has continued to have relatively low sodium levels. Edema has resolved with diuretics.   Patient has done so well with resolution of edema that he discontinued use of all diuretics as of ~5/2017. He has noted no increase in fluid and has had an improvement in muscle cramping symptoms and subsequent improved sleeping patterns. The patient has esophageal varices without bleeding. Varices have been treated with band ligation and now TIPS. Recent US of the liver in 6/2019 showed that this was patent and the liver without mass. Additional study from this morning is pending at the time of office visit. The patient's weight has remained elevated for much of the past year. I have encouraged him to target weight loss as a goal. He is working toward a target weight of 215-20#. The patient completes all daily activities without any functional limitations. The patient has not experienced fatigue, fevers, chills, pain in the right side over the liver, problems concentrating, swelling of the abdomen, swelling of the lower extremities, hematemesis, hematochezia. ALLERGIES  Allergies   Allergen Reactions    Erythromycin Other (comments) and Rash     Headache, n/v, diarrhea  Headache, n/v, diarrhea      Lipitor [Atorvastatin] Cough and Shortness of Breath    Pravastatin Cough and Shortness of Breath    Azithromycin Diarrhea and Palpitations       MEDICATIONS  Current Outpatient Medications   Medication Sig    traMADol (ULTRAM) 50 mg tablet Take 50 mg by mouth every six (6) hours as needed for Pain.  metoprolol tartrate (LOPRESSOR) 25 mg tablet Take  by mouth two (2) times a day.  losartan (COZAAR) 50 mg tablet Take 50 mg by mouth daily. No current facility-administered medications for this visit. SYSTEM REVIEW NOT RELATED TO LIVER DISEASE OR REVIEWED ABOVE:  Constitution systems: Negative for fever, chills, weight loss. Weight up a bit from last year. Eyes: Negative for visual changes. ENT: Negative for sore throat, painful swallowing. Respiratory: Negative for cough, hemoptysis, SOB. Cardiology: Negative for chest pain, palpitations. GI:  Negative for constipation or diarrhea. : Negative for urinary frequency, dysuria, hematuria, nocturia. Skin: Negative for rash. Well-healed umbilical scar. Hematology: Negative for easy bruising, blood clots. Musculo-skeletal: Negative for back pain, muscle pain, weakness. Neurologic: Negative for headaches, dizziness, vertigo, memory problems not related to HE. Psychology: Negative for anxiety, depression. FAMILY HISTORY:  The father  of head and neck CA. The mother  of brain cancer. There is no family history of liver disease. SOCIAL HISTORY:  The patient is . The patient has 1 child and 1 grandchild. The patient has never used tobacco products. The patient previously consumed 4-6 alcoholic beverages per day. He states that he has had no alcohol since late 2015. The patient used to work as an  for [employer]. The patient retired in 2015. PHYSICAL EXAMINATION:  Visit Vitals  /82 (BP 1 Location: Left arm, BP Patient Position: Sitting)   Pulse (!) 57   Temp 97.9 °F (36.6 °C) (Tympanic)   Ht 6' 3\" (1.905 m)   Wt 239 lb (108.4 kg)   BMI 29.87 kg/m²     General: No acute distress. Eyes: Sclera anicteric. ENT: No oral lesions. Thyroid normal.  Nodes: No adenopathy. Skin: No spider angiomata. No jaundice. No palmar erythema. Positive for onychomycosis x 3 fingers. Respiratory: Lungs clear to auscultation. Cardiovascular: Regular heart rate. Grade I/VI holosystolic murmur. No JVD. Abdomen: Soft non-tender. Liver palpable 2-3 cm BCM. No obvious ascites. Well-healed umbilical hernia scar. Extremities: No edema. No muscle wasting. No gross arthritic changes. Neurologic: Alert and oriented. Cranial nerves grossly intact. No asterixis.     LABORATORY STUDIES:  [facility] [place] [facility] [address] [place] [address] & Units 2018   WBC 3.4 - 10.8 x10E3/uL 5.9 4.4   ANC 1.8 - 8.0 K/UL     HGB 13.0 - 17.7 g/dL 15.3 14.3    - 450 x10E3/uL 164 193   INR 0.8 - 1.2 1.3 (H) 1.3 (H)   AST 0 - 40 IU/L 39 53 (H)   ALT 0 - 44 IU/L 26 31   Alk Phos 39 - 117 IU/L 84 69   Bili, Total 0.0 - 1.2 mg/dL 1.7 (H) 1.4 (H)   Bili, Direct 0.00 - 0.40 mg/dL 0.46 (H) 0.43 (H)   Albumin 3.6 - 4.8 g/dL 3.9 3.9   BUN 8 - 27 mg/dL 5 (L) 7 (L)   Creat 0.76 - 1.27 mg/dL 0.65 (L) 0.69 (L)   Na 134 - 144 mmol/L 129 (L) 130 (L)   K 3.5 - 5.2 mmol/L 4.5 4.4   Cl 96 - 106 mmol/L 96 97   CO2 20 - 29 mmol/L 23 23   Glucose 65 - 99 mg/dL 85 88     Liver [facility] Latest Ref Rng & Units 6/8/2018   WBC 3.4 - 10.8 x10E3/uL 5.5   ANC 1.8 - 8.0 K/UL    HGB 13.0 - 17.7 g/dL 14.5    - 450 x10E3/uL 152   INR 0.8 - 1.2 1.2   AST 0 - 40 IU/L 40   ALT 0 - 44 IU/L 25   Alk Phos 39 - 117 IU/L 96   Bili, Total 0.0 - 1.2 mg/dL 1.1   Bili, Direct 0.00 - 0.40 mg/dL 0.40   Albumin 3.6 - 4.8 g/dL 3.6   BUN 8 - 27 mg/dL 6 (L)   Creat 0.76 - 1.27 mg/dL 0.65 (L)   Na 134 - 144 mmol/L 132 (L)   K 3.5 - 5.2 mmol/L 4.8   Cl 96 - 106 mmol/L 96   CO2 20 - 29 mmol/L 24   Glucose 65 - 99 mg/dL 84     Cancer Screening Latest Ref Rng & Units 6/12/2019 12/12/2018 6/8/2018   AFP, Serum 0.0 - 8.0 ng/mL 1.8 2.1 2.0   AFP-L3% 0.0 - 9.9 % Comment Comment Comment   Additional lab values drawn at today's office visit are pending at the time of documentation. SEROLOGIES:  Serologies Latest Ref Rng 9/25/2015   Hep A Ab, Total NEGATIVE   NEGATIVE   Hep B Core Ab, Total NEGATIVE   NEGATIVE   Ferritin 26 - 388 NG/ (H)   Iron % Saturation 20 - 50 % 27   Alpha-1 antitrypsin level 90 - 200 mg/dL 165   9/2015. HBsurface antigen negative, Anti-HBsurface negative, anti-HCV negative. LIVER HISTOLOGY:  6/2017. FibroScan performed at [facility]. EkPa was 17. Suggested fibrosis level is F3. ENDOSCOPIC PROCEDURES:  9/2015. EGD performed by [facility]. Medium esophageal varices. Banding performed.   Mild portal gastropathy. RADIOLOGY:  9/2015. Ultrasound of liver. Echogenic consistent with cirrhosis. No liver mass lesions. No dilated bile ducts. Severe ascites. 1/2016. CT scan abdomen with and without IV contrast.  Changes consistent with cirrhosis. No liver mass lesions. No dilated bile ducts. Small ascites. 5/2016. Ultrasound of liver. Echogenic consistent with cirrhosis. No liver mass lesions. No dilated bile ducts. No ascites. TIPS patent. 11/2016. Ultrasound of liver. Patent TIPS. No significant ascites. Heterogeneous liver.  5/2017. Ultrasound of liver. Echogenic consistent with cirrhosis. Patent TIPS, no liver mass lesions. No dilated bile ducts. No ascites. 11/2017. Ultrasound of liver. Echogenic consistent with chronic liver disease. Patent TIPS, but with some diminished velocities. No liver mass lesions. No dilated bile ducts. No ascites. 5/2018. Ultrasound of liver. Echogenic consistent with cirrhosis. No liver mass lesions. No dilated bile ducts. No ascites. TIPS patent, appropriate velocities. 11/2018. Ultrasound of liver. Echogenic consistent with cirrhosis. No liver mass lesions. No dilated bile ducts. No ascites. TIPS patent, appropriate velocities. 6/2019. Ultrasound of liver. Echogenic consistent with cirrhosis. No liver mass lesions. No dilated bile ducts. No ascites. TIPS patent, appropriate velocities. 12/2019. Doppler ultrasound of liver. Pending at the time of office visit. OTHER TESTING:  Not available or performed    ASSESSMENT AND PLAN:  Cirrhosis secondary to alcohol. Recent labs show stable liver function dramatically improved since cessation of alcohol. He feels that he is back to his baseline status for strength and stamina. He is doing even better with the recent weight losses.    On the basis of the ongoing improving labs, patient had shown some marked improvement and there is no indication for transplant consideration at this time. Ascites and edema have now resolved following placement of TIPS, this is patent on recent US doppler in 6/2019, will follow-up on new report as soon as available. No indication at present for revision as there is no indication of ascites. Patient has discontinued use of diuretics as of 5/2017 and has had no reaccumulation. I will continue to monitor with US every 6 months. Lower extremity edema has resolved. Esophageal varices without prior bleeding. Varices have been banded. TIPS will treat varices he will not require additional EGD with banding. Follow-up per Dr. Brigido Sifuentes. Hepatic encephalopathy has not developed to date. There is no need for treatment with lactulose and/or Xifaxan at this time. No need to restrict dietary protein at this time. Patient is taking OTC Miralax as needed to maintain bowel regularity. The patient was directed to continue all current medications at the current dosages. There are no contraindications for the patient to take any medications that are necessary for treatment of other medical issues. The patient was counseled regarding alcohol consumption. I have congratulated him on attaining >3 years' sobriety. Vaccination for viral hepatitis A and B is recommended since the patient has no serologic evidence of previous exposure or vaccination with immunity. [address] screening has recently been performed and does not suggest [address] The next liver imaging study will be performed in 6/2020 for US with doppler study, this has been ordered. All of the above issues were discussed with the patient. All questions were answered. The patient expressed a clear understanding of the above. [address] [place] [address] 87 in 6 months with repeat US of the TIPS prior to follow-up.     Pushpa Mcfarlane PA-C  [facility] [place] [address], [address] [place]  Radha Parmarjose eliasles  22.  [phone]

## 2019-12-12 NOTE — PROGRESS NOTES
Nicole Fonseca is a 62 y.o. male  Chief Complaint   Patient presents with    Follow-up         Visit Vitals  /82 (BP 1 Location: Left arm, BP Patient Position: Sitting)   Pulse (!) 57   Temp 97.9 °F (36.6 °C) (Tympanic)   Ht 6' 3\" (1.905 m)   Wt 239 lb (108.4 kg)   BMI 29.87 kg/m²     3 most recent PHQ Screens 6/12/2019   Little interest or pleasure in doing things Not at all   Feeling down, depressed, irritable, or hopeless Not at all   Total Score PHQ 2 0     Learning Assessment 6/12/2019   PRIMARY LEARNER Patient   HIGHEST LEVEL OF EDUCATION - PRIMARY LEARNER  -   BARRIERS PRIMARY LEARNER NONE   CO-LEARNER CAREGIVER No   PRIMARY LANGUAGE ENGLISH   LEARNER PREFERENCE PRIMARY LISTENING   LEARNING SPECIAL TOPICS -   ANSWERED BY patient   RELATIONSHIP SELF     Abuse Screening Questionnaire 6/12/2019   Do you ever feel afraid of your partner? N   Are you in a relationship with someone who physically or mentally threatens you? N   Is it safe for you to go home? Y         1. Have you been to the ER, urgent care clinic since your last visit? Hospitalized since your last visit? No    2. Have you seen or consulted any other health care providers outside of the [address] [place] [address] since your last visit? Include any pap smears or colon screening.  No

## 2019-12-13 LAB
AFP L3 MFR SERPL: NORMAL % (ref 0–9.9)
AFP SERPL-MCNC: 1.8 NG/ML (ref 0–8)
ALBUMIN SERPL-MCNC: 4 G/DL (ref 3.6–4.8)
ALP SERPL-CCNC: 71 IU/L (ref 39–117)
ALT SERPL-CCNC: 28 IU/L (ref 0–44)
AST SERPL-CCNC: 50 IU/L (ref 0–40)
BILIRUB DIRECT SERPL-MCNC: 0.61 MG/DL (ref 0–0.4)
BILIRUB SERPL-MCNC: 2.5 MG/DL (ref 0–1.2)
BUN SERPL-MCNC: 6 MG/DL (ref 8–27)
BUN/CREAT SERPL: 8 (ref 10–24)
CALCIUM SERPL-MCNC: 9.4 MG/DL (ref 8.6–10.2)
CHLORIDE SERPL-SCNC: 98 MMOL/L (ref 96–106)
CO2 SERPL-SCNC: 23 MMOL/L (ref 20–29)
CREAT SERPL-MCNC: 0.75 MG/DL (ref 0.76–1.27)
ERYTHROCYTE [DISTWIDTH] IN BLOOD BY AUTOMATED COUNT: 12.6 % (ref 12.3–15.4)
GLUCOSE SERPL-MCNC: 83 MG/DL (ref 65–99)
HCT VFR BLD AUTO: 45 % (ref 37.5–51)
HGB BLD-MCNC: 16.5 G/DL (ref 13–17.7)
INR PPP: 1.3 (ref 0.8–1.2)
MCH RBC QN AUTO: 33.1 PG (ref 26.6–33)
MCHC RBC AUTO-ENTMCNC: 36.7 G/DL (ref 31.5–35.7)
MCV RBC AUTO: 90 FL (ref 79–97)
MORPHOLOGY BLD-IMP: ABNORMAL
PLATELET # BLD AUTO: 177 X10E3/UL (ref 150–450)
POTASSIUM SERPL-SCNC: 5 MMOL/L (ref 3.5–5.2)
PROTHROMBIN TIME: 13.8 SEC (ref 9.1–12)
RBC # BLD AUTO: 4.98 X10E6/UL (ref 4.14–5.8)
SODIUM SERPL-SCNC: 134 MMOL/L (ref 134–144)
TIPS PORTAL VEIN INFLOW PSV: 33.8 CM/S
TIPS STENT HEPATIC END PSV: 67.8 CM/S
TIPS STENT MID PSV: 84.4 CM/S
TIPS STENT PORTAL END PSV: 86.8 CM/S
WBC # BLD AUTO: 5.3 X10E3/UL (ref 3.4–10.8)

## 2019-12-19 NOTE — PROGRESS NOTES
Small nodule, will continue to watch on routine, regular imaging as too small to characterized at present. AFP normal. Pt notified.

## 2020-07-06 ENCOUNTER — HOSPITAL ENCOUNTER (OUTPATIENT)
Dept: ULTRASOUND IMAGING | Age: 65
Discharge: HOME | End: 2020-07-06
Attending: PHYSICIAN ASSISTANT
Payer: COMMERCIAL

## 2020-07-06 DIAGNOSIS — Z95.828 S/P TIPS (TRANSJUGULAR INTRAHEPATIC PORTOSYSTEMIC SHUNT): ICD-10-CM

## 2020-07-06 DIAGNOSIS — K70.30 ALCOHOLIC CIRRHOSIS OF LIVER WITHOUT ASCITES (HCC): ICD-10-CM

## 2020-07-06 LAB
TIPS PORTAL VEIN INFLOW PSV: 14.1 CM/S
TIPS STENT HEPATIC END PSV: 80.2 CM/S
TIPS STENT MID PSV: 105.8 CM/S
TIPS STENT PORTAL END PSV: 151.7 CM/S

## 2020-07-06 PROCEDURE — 76705 ECHO EXAM OF ABDOMEN: CPT

## 2020-07-06 PROCEDURE — 93976 VASCULAR STUDY: CPT

## 2020-07-08 ENCOUNTER — VIRTUAL VISIT (OUTPATIENT)
Dept: HEMATOLOGY | Age: 65
End: 2020-07-08

## 2020-07-08 DIAGNOSIS — K70.30 ALCOHOLIC CIRRHOSIS OF LIVER WITHOUT ASCITES (HCC): Primary | ICD-10-CM

## 2020-07-08 DIAGNOSIS — Z95.828 S/P TIPS (TRANSJUGULAR INTRAHEPATIC PORTOSYSTEMIC SHUNT): ICD-10-CM

## 2020-07-08 NOTE — PROGRESS NOTES
Stephon Yepez is a 60 y.o. male, evaluated via audio-only technology on 7/8/2020 for follow-up of cirrhosis due to past Etoh use. Assessment & Plan:   Cirrhosis due to Etoh  Stable functions with cessation of alcohol years ago. Liver functions have been stable on past labs, no indication for transplant evaluation. t bili has been high in the past, this is largely indirect consistent with TIPS hemolysis vs [place]. Has had no complications of HE with the TIPS, not presently taking lactulose or Xifaxan, he has Miralax on hand as needed. TIPS was evaluated this week, patent with some minor increase in velocity. No ascites. Will plan to send patient lab order for semiannual lab recheck and have him return to the clinic in 6 months for repeat USTIPS evalution. 12  Subjective:   Feeling great, working out regularly and feeling well in general. No new physical complaints. He denies bleeding signs and fluid overload, no edema or abdominal distention. Prior to Admission medications    Medication Sig Start Date End Date Taking? Authorizing Provider   traMADol (ULTRAM) 50 mg tablet Take 50 mg by mouth every six (6) hours as needed for Pain. Provider, Historical   metoprolol tartrate (LOPRESSOR) 25 mg tablet Take  by mouth two (2) times a day. Provider, Historical   losartan (COZAAR) 50 mg tablet Take 50 mg by mouth daily. 4/28/16   Provider, Historical     No flowsheet data found. Stephon Yepez, who was evaluated through a patient-initiated, synchronous (real-time) audio only encounter, and/or her healthcare decision maker, is aware that it is a billable service, with coverage as determined by his insurance carrier. He provided verbal consent to proceed: Yes. He has not had a related appointment within my department in the past 7 days or scheduled within the next 24 hours. Total Time: minutes: 11-20 minutes    Follow-up: 6 months with repeat US/doppler at that time.  Lab order mailed to patient in the meantime.      Nadir Lynch, PA

## 2020-07-08 NOTE — PROGRESS NOTES
Reviewed with patient at the time of virtual office visit. Follow-up in 6 months for repeat imaging of liver and TIPS.

## 2020-07-15 DIAGNOSIS — K70.30 ALCOHOLIC CIRRHOSIS OF LIVER WITHOUT ASCITES (HCC): ICD-10-CM

## 2020-07-15 DIAGNOSIS — Z95.828 S/P TIPS (TRANSJUGULAR INTRAHEPATIC PORTOSYSTEMIC SHUNT): ICD-10-CM

## 2020-07-29 LAB
ERYTHROCYTE [DISTWIDTH] IN BLOOD BY AUTOMATED COUNT: 12.5 % (ref 11.6–15.4)
HCT VFR BLD AUTO: 42.2 % (ref 37.5–51)
HGB BLD-MCNC: 15 G/DL (ref 13–17.7)
MCH RBC QN AUTO: 33.2 PG (ref 26.6–33)
MCHC RBC AUTO-ENTMCNC: 35.5 G/DL (ref 31.5–35.7)
MCV RBC AUTO: 93 FL (ref 79–97)
PLATELET # BLD AUTO: 164 X10E3/UL (ref 150–450)
RBC # BLD AUTO: 4.52 X10E6/UL (ref 4.14–5.8)
WBC # BLD AUTO: 6.6 X10E3/UL (ref 3.4–10.8)

## 2020-07-30 LAB
AFP L3 MFR SERPL: NORMAL % (ref 0–9.9)
AFP SERPL-MCNC: 2.2 NG/ML (ref 0–8)
ALBUMIN SERPL-MCNC: 4 G/DL (ref 3.8–4.8)
ALP SERPL-CCNC: 75 IU/L (ref 39–117)
ALT SERPL-CCNC: 25 IU/L (ref 0–44)
AST SERPL-CCNC: 43 IU/L (ref 0–40)
BILIRUB DIRECT SERPL-MCNC: 0.63 MG/DL (ref 0–0.4)
BILIRUB SERPL-MCNC: 2.3 MG/DL (ref 0–1.2)
BUN SERPL-MCNC: 4 MG/DL (ref 8–27)
BUN/CREAT SERPL: 5 (ref 10–24)
CALCIUM SERPL-MCNC: 8.9 MG/DL (ref 8.6–10.2)
CHLORIDE SERPL-SCNC: 96 MMOL/L (ref 96–106)
CO2 SERPL-SCNC: 21 MMOL/L (ref 20–29)
CREAT SERPL-MCNC: 0.74 MG/DL (ref 0.76–1.27)
GLUCOSE SERPL-MCNC: 95 MG/DL (ref 65–99)
INR PPP: 1.2 (ref 0.8–1.2)
POTASSIUM SERPL-SCNC: 3.9 MMOL/L (ref 3.5–5.2)
PROT SERPL-MCNC: 6.1 G/DL (ref 6–8.5)
PROTHROMBIN TIME: 12.9 SEC (ref 9.1–12)
SODIUM SERPL-SCNC: 131 MMOL/L (ref 134–144)

## 2020-07-31 NOTE — PROGRESS NOTES
Patient notified of stable lab findings, follow-up as scheduled in 6 months with US/TIPS assessment.

## 2021-01-05 ENCOUNTER — HOSPITAL ENCOUNTER (OUTPATIENT)
Dept: ULTRASOUND IMAGING | Age: 66
Discharge: HOME | End: 2021-01-05
Attending: PHYSICIAN ASSISTANT
Payer: COMMERCIAL

## 2021-01-05 DIAGNOSIS — Z95.828 S/P TIPS (TRANSJUGULAR INTRAHEPATIC PORTOSYSTEMIC SHUNT): ICD-10-CM

## 2021-01-05 DIAGNOSIS — K70.30 ALCOHOLIC CIRRHOSIS OF LIVER WITHOUT ASCITES (HCC): ICD-10-CM

## 2021-01-05 LAB
TIPS PORTAL VEIN INFLOW PSV: 64 CM/S
TIPS STENT HEPATIC END PSV: 96 CM/S
TIPS STENT MID PSV: 87 CM/S
TIPS STENT PORTAL END PSV: 84 CM/S

## 2021-01-05 PROCEDURE — 76705 ECHO EXAM OF ABDOMEN: CPT

## 2021-01-05 PROCEDURE — 93976 VASCULAR STUDY: CPT

## 2021-01-25 ENCOUNTER — VIRTUAL VISIT (OUTPATIENT)
Dept: HEMATOLOGY | Age: 66
End: 2021-01-25
Payer: COMMERCIAL

## 2021-01-25 DIAGNOSIS — Z95.828 S/P TIPS (TRANSJUGULAR INTRAHEPATIC PORTOSYSTEMIC SHUNT): ICD-10-CM

## 2021-01-25 DIAGNOSIS — K70.30 ALCOHOLIC CIRRHOSIS OF LIVER WITHOUT ASCITES (HCC): Primary | ICD-10-CM

## 2021-01-25 PROCEDURE — 99214 OFFICE O/P EST MOD 30 MIN: CPT | Performed by: PHYSICIAN ASSISTANT

## 2021-01-25 NOTE — PROGRESS NOTES
Chris Arroyo MD, MD Lorie Jaramillo PA-C Janan Alm, [facility]     April Idania Granados, AWASI Higuera, AWAIS Almaraz [facility] [address]    at [facility]    [address] [facility], [address] [place] [address], [facility] 22.    [phone]    FAX: [address] [place] [address], [address], [address]    [phone]    FAX: [phone]       Patient Care Team:  Erma Luo MD as PCP - General ([address])  Erma Luo MD (General Practice)      Problem List  Date Reviewed: 7/8/2020          Codes Class Noted    Ascites ICD-10-CM: R18.8  ICD-9-CM: 789.59  12/26/2015        S/P TIPS (transjugular intrahepatic portosystemic shunt) ICD-10-CM: Z89.577  ICD-9-CM: V45.89  12/26/2015        Cirrhosis, alcoholic ([facility] RLE-58-QS: K70.30  ICD-9-CM: 571.2  10/6/2015        Hyponatremia ICD-10-CM: E87.1  ICD-9-CM: 276.1  9/24/2015             VIRTUAL TELEHEALTH VISIT PERFORMED DUE TO COVID-19 EPIDEMIC    CONSENT:  Yelena Haynes, who was seen by synchronous, real-time, audio-video technology, and/or his healthcare decision maker, is aware that this patient-initiated, Telehealth encounter on 1/25/2021 is a billable service, with coverage as determined by his insurance carrier. He is aware that he may receive a bill and has provided verbal consent to proceed. This patient was evaluated during a Virtual Telehealth visit. A caregiver was present if appropriate.  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 The active problem list, all pertinent past medical history, medications, endoscopic studies, radiologic findings and laboratory findings related to the liver disorder were reviewed with the patient. The patient is a 60 y.o.  male who was first found to have cirrhosis in 9/2015 when developed ascites. Ascites became refractory to diuretics because of hyponatremia. The patient underwent TIPS for treatment of refactory ascites in 12/2015. He has done extremely since placement of the TIPS and has had no complication of this procedure. He has continued to have relatively low sodium levels. Edema has resolved with diuretics. Patient has done so well with resolution of edema that he discontinued use of all diuretics as of ~5/2017. He has noted no increase in fluid and has had an improvement in muscle cramping symptoms and subsequent improved sleeping patterns. The patient has esophageal varices without bleeding. Varices have been treated with band ligation and now TIPS. Recent US of the liver in 1/2021 showed that this was patent and the liver without mass. Additional study from this morning is pending at the time of office visit. The patient completes all daily activities without any functional limitations. The patient has not experienced fatigue, fevers, chills, pain in the right side over the liver, problems concentrating, swelling of the abdomen, swelling of the lower extremities, hematemesis, hematochezia. Recent knee pain and swelling, unsure what he did to it. This responded to ibuprofen and prednisone injection and has not returned. Patient reports that he is weighing ~240-245#, working on this. BP has been running. Maintaining follow-up with PCP on every 3 month basis.      ALLERGIES  Allergies   Allergen Reactions    Erythromycin Other (comments) and Rash     Headache, n/v, diarrhea  Headache, n/v, diarrhea      Lipitor [Atorvastatin] Cough and Shortness of Breath    Pravastatin Cough and Shortness of Breath    Azithromycin Diarrhea and Palpitations       MEDICATIONS  Current Outpatient Medications   Medication Sig    traMADol (ULTRAM) 50 mg tablet Take 50 mg by mouth every six (6) hours as needed for Pain.  metoprolol tartrate (LOPRESSOR) 25 mg tablet Take  by mouth two (2) times a day.  losartan (COZAAR) 50 mg tablet Take 50 mg by mouth daily. No current facility-administered medications for this visit. SYSTEM REVIEW NOT RELATED TO LIVER DISEASE OR REVIEWED ABOVE:  Constitution systems: Negative for fever, chills, weight loss. Weight up a bit from last year. Eyes: Negative for visual changes. ENT: Negative for sore throat, painful swallowing. Respiratory: Negative for cough, hemoptysis, SOB. Cardiology: Negative for chest pain, palpitations. GI:  Negative for constipation or diarrhea. : Negative for urinary frequency, dysuria, hematuria, nocturia. Skin: Negative for rash. Well-healed umbilical scar. Hematology: Negative for easy bruising, blood clots. Musculo-skeletal: Negative for back pain, muscle pain, weakness. Neurologic: Negative for headaches, dizziness, vertigo, memory problems not related to HE. Psychology: Negative for anxiety, depression. FAMILY HISTORY:  The father  of head and neck CA. The mother  of brain cancer. There is no family history of liver disease. SOCIAL HISTORY:  The patient is . The patient has 1 child and 1 grandchild. The patient has never used tobacco products. The patient previously consumed 4-6 alcoholic beverages per day. He states that he has had no alcohol since late 2015. The patient used to work as an  for [employer]. The patient retired in 2015. PHYSICAL EXAMINATION PERFORMED BY Generate:  VS: Not performed   General: No acute distress. Eyes: Sclera anicteric. ENT: No oral lesions.     Skin: No rashes. spider angiomata. No jaundice. Abdomen: No obvious distention suggesting ascites. Extremities: No edema. No muscle wasting. Neurologic: Alert and oriented. Cranial nerves grossly intact. LABORATORY STUDIES:  [facility] [place] of [address] [place] [address] [address] & Units 7/29/2020 12/12/2019   WBC 3.4 - 10.8 x10E3/uL 6.6 5.3   HGB 13.0 - 17.7 g/dL 15.0 16.5    - 450 x10E3/uL 164 177   INR 0.8 - 1.2 1.2 1.3 (H)   AST 0 - 40 IU/L 43 (H) 50 (H)   ALT 0 - 44 IU/L 25 28   Alk Phos 39 - 117 IU/L 75 71   Bili, Total 0.0 - 1.2 mg/dL 2.3 (H) 2.5 (H)   Bili, Direct 0.00 - 0.40 mg/dL 0.63 (H) 0.61 (H)   Albumin 3.8 - 4.8 g/dL 4.0 4.0   BUN 8 - 27 mg/dL 4 (L) 6 (L)   Creat 0.76 - 1.27 mg/dL 0.74 (L) 0.75 (L)   Na 134 - 144 mmol/L 131 (L) 134   K 3.5 - 5.2 mmol/L 3.9 5.0   Cl 96 - 106 mmol/L 96 98   CO2 20 - 29 mmol/L 21 23   Glucose 65 - 99 mg/dL 95 83     [facility] [place] of [facility] Latest Ref Rng & Units 6/12/2019   WBC 3.4 - 10.8 x10E3/uL 5.9   HGB 13.0 - 17.7 g/dL 15.3    - 450 x10E3/uL 164   INR 0.8 - 1.2 1.3 (H)   AST 0 - 40 IU/L 39   ALT 0 - 44 IU/L 26   Alk Phos 39 - 117 IU/L 84   Bili, Total 0.0 - 1.2 mg/dL 1.7 (H)   Bili, Direct 0.00 - 0.40 mg/dL 0.46 (H)   Albumin 3.8 - 4.8 g/dL 3.9   BUN 8 - 27 mg/dL 5 (L)   Creat 0.76 - 1.27 mg/dL 0.65 (L)   Na 134 - 144 mmol/L 129 (L)   K 3.5 - 5.2 mmol/L 4.5   Cl 96 - 106 mmol/L 96   CO2 20 - 29 mmol/L 23   Glucose 65 - 99 mg/dL 85     Cancer Screening Latest Ref Rng & Units 7/29/2020 12/12/2019 6/12/2019   AFP, Serum 0.0 - 8.0 ng/mL 2.2 1.8 1.8   AFP-L3% 0.0 - 9.9 % Comment Comment Comment     SEROLOGIES:  Serologies Latest Ref Rng 9/25/2015   Hep A Ab, Total NEGATIVE   NEGATIVE   Hep B Core Ab, Total NEGATIVE   NEGATIVE   Ferritin 26 - 388 NG/ (H)   Iron % Saturation 20 - 50 % 27   Alpha-1 antitrypsin level 90 - 200 mg/dL 165   9/2015. HBsurface antigen negative, Anti-HBsurface negative, anti-HCV negative. LIVER HISTOLOGY:  6/2017.   FibroScan performed at [address]. EkPa was 17. Suggested fibrosis level is F3. ENDOSCOPIC PROCEDURES:  9/2015. EGD performed by [facility]. Medium esophageal varices. Banding performed. Mild portal gastropathy. RADIOLOGY:  9/2015. Ultrasound of liver. Echogenic consistent with cirrhosis. No liver mass lesions. No dilated bile ducts. Severe ascites. 1/2016. CT scan abdomen with and without IV contrast.  Changes consistent with cirrhosis. No liver mass lesions. No dilated bile ducts. Small ascites. 5/2016. Ultrasound of liver. Echogenic consistent with cirrhosis. No liver mass lesions. No dilated bile ducts. No ascites. TIPS patent. 11/2016. Ultrasound of liver. Patent TIPS. No significant ascites. Heterogeneous liver.  5/2017. Ultrasound of liver. Echogenic consistent with cirrhosis. Patent TIPS, no liver mass lesions. No dilated bile ducts. No ascites. 11/2017. Ultrasound of liver. Echogenic consistent with chronic liver disease. Patent TIPS, but with some diminished velocities. No liver mass lesions. No dilated bile ducts. No ascites. 5/2018. Ultrasound of liver. Echogenic consistent with cirrhosis. No liver mass lesions. No dilated bile ducts. No ascites. TIPS patent, appropriate velocities. 11/2018. Ultrasound of liver. Echogenic consistent with cirrhosis. No liver mass lesions. No dilated bile ducts. No ascites. TIPS patent, appropriate velocities. 6/2019. Ultrasound of liver. Echogenic consistent with cirrhosis. No liver mass lesions. No dilated bile ducts. No ascites. TIPS patent, appropriate velocities. 12/2019. Doppler ultrasound of liver. Echogenic consistent with cirrhosis. No liver mass lesions. No dilated bile ducts. No ascites. TIPS patent, appropriate velocities. Pending at the time of office visit. 7/2020. Ultrasound of liver. Echogenic consistent with cirrhosis. No liver mass lesions, incidental liver cysts.   No dilated bile ducts. No ascites. TIPS patent, appropriate velocities. 1/2021. Ultrasound of liver. Echogenic consistent with cirrhosis. No liver mass lesions, stable hypoechoic liver cysts. No dilated bile ducts. No ascites. TIPS patent, appropriate velocities. OTHER TESTING:  Not available or performed    ASSESSMENT AND PLAN:  Cirrhosis secondary to alcohol. Last labs show stable liver function dramatically improved since cessation of alcohol. He feels that he is back to his baseline status for strength and stamina. On the basis of the ongoing improving labs, patient had shown some marked improvement and there is no indication for transplant consideration at this time. Ascites and edema have now resolved following placement of TIPS, this is patent on recent US doppler in 1/2021. No indication at present for revision as there is no indication of ascites. Patient has discontinued use of diuretics as of 5/2017 and has had no reaccumulation. I will continue to monitor with US every 6 months. Lower extremity edema has resolved. Esophageal varices without prior bleeding. Varices have been banded. TIPS will treat varices he will not require additional EGD with banding. Follow-up per Dr. Saroj Doyel. Hepatic encephalopathy has not developed to date. There is no need for treatment with lactulose and/or Xifaxan at this time. No need to restrict dietary protein at this time. Patient is taking OTC Miralax as needed to maintain bowel regularity. The patient was directed to continue all current medications at the current dosages. There are no contraindications for the patient to take any medications that are necessary for treatment of other medical issues. The patient was counseled regarding alcohol consumption. I have congratulated him on attaining >4 years' sobriety.     Vaccination for viral hepatitis A and B is recommended since the patient has no serologic evidence of previous exposure or vaccination with immunity.    HCC screening has recently been performed and does not suggest HCC.  The next liver imaging study will be performed in 7/2021 for US with doppler study, this has been ordered.    FOLLOW-UP AFTER VIRTUAL VISIT:  Pursuant to the emergency declaration under the [place] Act and the National Emergencies Act, 1135 waiver authority and the Coronavirus Preparedness and Response Supplemental Appropriations Act, this Virtual  Visit was conducted, with the patient's (and/or their legal guardian's) consent, to reduce the patient's risk of exposure to COVID-19 and provide necessary medical care.     Services were provided through a video synchronous discussion virtually to substitute for an in-person clinic visit.  The patient was located in their home.  The provider was located in the Liver [place] office.       All of the issues listed above in the Assessment and Plan were discussed with the patient.  All questions were answered.  The patient expressed a clear understanding of the above.    Follow-up [facility] in 6 months with repeat US of the TIPS at that time.  Will send patient lab order to obtain in the near future on a local basis.     Jenny Munoz PA-C  [facility]  [address] [place] [address], [address]  [place]  [address] [phone]  [facility]

## 2021-01-26 DIAGNOSIS — Z95.828 S/P TIPS (TRANSJUGULAR INTRAHEPATIC PORTOSYSTEMIC SHUNT): ICD-10-CM

## 2021-01-26 DIAGNOSIS — K70.30 ALCOHOLIC CIRRHOSIS OF LIVER WITHOUT ASCITES (HCC): Primary | ICD-10-CM

## 2021-02-25 LAB
ERYTHROCYTE [DISTWIDTH] IN BLOOD BY AUTOMATED COUNT: 11.8 % (ref 11.6–15.4)
HCT VFR BLD AUTO: 46.6 % (ref 37.5–51)
HGB BLD-MCNC: 16.7 G/DL (ref 13–17.7)
MCH RBC QN AUTO: 34.5 PG (ref 26.6–33)
MCHC RBC AUTO-ENTMCNC: 35.8 G/DL (ref 31.5–35.7)
MCV RBC AUTO: 96 FL (ref 79–97)
PLATELET # BLD AUTO: 181 X10E3/UL (ref 150–450)
RBC # BLD AUTO: 4.84 X10E6/UL (ref 4.14–5.8)
WBC # BLD AUTO: 6.1 X10E3/UL (ref 3.4–10.8)

## 2021-02-26 LAB
AFP L3 MFR SERPL: NORMAL % (ref 0–9.9)
AFP SERPL-MCNC: 3.5 NG/ML (ref 0–8)
ALBUMIN SERPL-MCNC: 4 G/DL (ref 3.8–4.8)
ALP SERPL-CCNC: 86 IU/L (ref 39–117)
ALT SERPL-CCNC: 31 IU/L (ref 0–44)
AST SERPL-CCNC: 43 IU/L (ref 0–40)
BILIRUB DIRECT SERPL-MCNC: 0.53 MG/DL (ref 0–0.4)
BILIRUB SERPL-MCNC: 1.6 MG/DL (ref 0–1.2)
BUN SERPL-MCNC: 4 MG/DL (ref 8–27)
BUN/CREAT SERPL: 5 (ref 10–24)
CALCIUM SERPL-MCNC: 9.2 MG/DL (ref 8.6–10.2)
CHLORIDE SERPL-SCNC: 98 MMOL/L (ref 96–106)
CO2 SERPL-SCNC: 23 MMOL/L (ref 20–29)
CREAT SERPL-MCNC: 0.75 MG/DL (ref 0.76–1.27)
GLUCOSE SERPL-MCNC: 101 MG/DL (ref 65–99)
INR PPP: 1.2 (ref 0.9–1.2)
POTASSIUM SERPL-SCNC: 4.5 MMOL/L (ref 3.5–5.2)
PROT SERPL-MCNC: 6.5 G/DL (ref 6–8.5)
PROTHROMBIN TIME: 12.6 SEC (ref 9.1–12)
SODIUM SERPL-SCNC: 133 MMOL/L (ref 134–144)

## 2021-03-01 NOTE — PROGRESS NOTES
Pt notified via [facility] of stable findings, follow-up as scheduled with [address] in 7/2021.

## 2021-07-28 ENCOUNTER — OFFICE VISIT (OUTPATIENT)
Dept: HEMATOLOGY | Age: 66
End: 2021-07-28
Payer: MEDICARE

## 2021-07-28 ENCOUNTER — HOSPITAL ENCOUNTER (OUTPATIENT)
Dept: ULTRASOUND IMAGING | Age: 66
Discharge: HOME | End: 2021-07-28
Attending: PHYSICIAN ASSISTANT
Payer: MEDICARE

## 2021-07-28 VITALS
WEIGHT: 250 LBS | HEART RATE: 75 BPM | SYSTOLIC BLOOD PRESSURE: 141 MMHG | TEMPERATURE: 98 F | DIASTOLIC BLOOD PRESSURE: 88 MMHG | RESPIRATION RATE: 17 BRPM | BODY MASS INDEX: 31.08 KG/M2 | HEIGHT: 75 IN | OXYGEN SATURATION: 99 %

## 2021-07-28 DIAGNOSIS — K70.30 ALCOHOLIC CIRRHOSIS OF LIVER WITHOUT ASCITES (HCC): ICD-10-CM

## 2021-07-28 DIAGNOSIS — K70.30 ALCOHOLIC CIRRHOSIS OF LIVER WITHOUT ASCITES (HCC): Primary | ICD-10-CM

## 2021-07-28 DIAGNOSIS — Z95.828 S/P TIPS (TRANSJUGULAR INTRAHEPATIC PORTOSYSTEMIC SHUNT): ICD-10-CM

## 2021-07-28 LAB
TIPS PORTAL VEIN INFLOW PSV: 67.5 CM/S
TIPS STENT HEPATIC END PSV: 93 CM/S
TIPS STENT MID PSV: 103.4 CM/S
TIPS STENT PORTAL END PSV: 91.1 CM/S

## 2021-07-28 PROCEDURE — G8432 DEP SCR NOT DOC, RNG: HCPCS | Performed by: PHYSICIAN ASSISTANT

## 2021-07-28 PROCEDURE — G8536 NO DOC ELDER MAL SCRN: HCPCS | Performed by: PHYSICIAN ASSISTANT

## 2021-07-28 PROCEDURE — 1101F PT FALLS ASSESS-DOCD LE1/YR: CPT | Performed by: PHYSICIAN ASSISTANT

## 2021-07-28 PROCEDURE — G8427 DOCREV CUR MEDS BY ELIG CLIN: HCPCS | Performed by: PHYSICIAN ASSISTANT

## 2021-07-28 PROCEDURE — G0463 HOSPITAL OUTPT CLINIC VISIT: HCPCS | Performed by: PHYSICIAN ASSISTANT

## 2021-07-28 PROCEDURE — 93976 VASCULAR STUDY: CPT

## 2021-07-28 PROCEDURE — 99214 OFFICE O/P EST MOD 30 MIN: CPT | Performed by: PHYSICIAN ASSISTANT

## 2021-07-28 PROCEDURE — 76705 ECHO EXAM OF ABDOMEN: CPT

## 2021-07-28 PROCEDURE — 3017F COLORECTAL CA SCREEN DOC REV: CPT | Performed by: PHYSICIAN ASSISTANT

## 2021-07-28 PROCEDURE — G8417 CALC BMI ABV UP PARAM F/U: HCPCS | Performed by: PHYSICIAN ASSISTANT

## 2021-07-28 RX ORDER — BISMUTH SUBSALICYLATE 262 MG
1 TABLET,CHEWABLE ORAL DAILY
COMMUNITY

## 2021-07-28 NOTE — PROGRESS NOTES
Identified pt with two pt identifiers(name and ). Reviewed record in preparation for visit and have obtained necessary documentation. Chief Complaint   Patient presents with    Cirrhosis Of Liver     6 mmo f/u      Vitals:    21 1119   BP: (!) 141/88   Pulse: 75   Resp: 17   Temp: 98 °F (36.7 °C)   TempSrc: Temporal   SpO2: 99%   Weight: 250 lb (113.4 kg)   Height: 6' 3\" (1.905 m)   PainSc:   0 - No pain       Health Maintenance Review: Patient reminded of \"due or due soon\" health maintenance. I have asked the patient to contact his/her primary care provider (PCP) for follow-up on his/her health maintenance. Coordination of Care Questionnaire:  :   1) Have you been to an emergency room, urgent care, or hospitalized since your last visit? If yes, where when, and reason for visit? no       2. Have seen or consulted any other health care provider since your last visit? If yes, where when, and reason for visit? YES, f/u with PCP every 4 mo      Patient is accompanied by self I have received verbal consent from Wang Quintana to discuss any/all medical information while they are present in the room.

## 2021-07-28 NOTE — PROGRESS NOTES
[facility], MD, MD Brandon Kurtz PA-C Juanita Mola, [facility]     April Jason Frederick, AWAIS Powell, AWAIS Madrid, AWAIS Byers [facility] [address]    at [address], [address] Stephanie Lucas  22.    [phone]    FAX: [address] [place] [address]    at [address], [address]    [phone]    FAX: [phone]       Patient Care Team:  Enrrique Tai MD as PCP - General ([facility])  Enrrique Tai MD (General Practice)      Problem List  Date Reviewed: 7/8/2020        Codes Class Noted    Ascites ICD-10-CM: R18.8  ICD-9-CM: 789.59  12/26/2015        S/P TIPS (transjugular intrahepatic portosystemic shunt) ICD-10-CM: B90.157  ICD-9-CM: V45.89  12/26/2015        Cirrhosis, alcoholic ([facility] ZAV-40-KN: K70.30  ICD-9-CM: 571.2  10/6/2015        Hyponatremia ICD-10-CM: E87.1  ICD-9-CM: 276.1  9/24/2015             [facility] for management of 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 The active problem list, all pertinent past medical history, medications, endoscopic studies, radiologic findings and laboratory findings related to the liver disorder were reviewed with the patient. The patient is a 73 y.o.  male who was first found to have cirrhosis in 9/2015 when developed ascites. Ascites became refractory to diuretics because of hyponatremia. The patient underwent TIPS for treatment of refactory ascites in 12/2015. He has done extremely since placement of the TIPS and has had no complication of this procedure. He has continued to have relatively low sodium levels. Edema has resolved with diuretics.   Patient has done so well with resolution of edema that he discontinued use of all diuretics as of ~5/2017. He has noted no increase in fluid and has had an improvement in muscle cramping symptoms and subsequent improved sleeping patterns. He is continuing to watch his sodium intake carefully. The patient has esophageal varices without bleeding. Varices have been treated with band ligation and now TIPS. Recent US of the liver in 1/2021 showed that this was patent and the liver without mass. Additional study from this morning is pending at the time of office visit. The patient completes all daily activities without any functional limitations. The patient has not experienced fatigue, fevers, chills, pain in the right side over the liver, problems concentrating, swelling of the abdomen, swelling of the lower extremities, hematemesis, hematochezia. ALLERGIES  Allergies   Allergen Reactions    Erythromycin Other (comments) and Rash     Headache, n/v, diarrhea  Headache, n/v, diarrhea      Lipitor [Atorvastatin] Cough and Shortness of Breath    Pravastatin Cough and Shortness of Breath    Azithromycin Diarrhea and Palpitations       MEDICATIONS  Current Outpatient Medications   Medication Sig    multivitamin (Men's Multi-Vitamin) tablet Take 1 Tablet by mouth daily.  metoprolol tartrate (LOPRESSOR) 25 mg tablet Take  by mouth two (2) times a day.  losartan (COZAAR) 50 mg tablet Take 50 mg by mouth daily.  traMADol (ULTRAM) 50 mg tablet Take 50 mg by mouth every six (6) hours as needed for Pain. (Patient not taking: Reported on 7/28/2021)     No current facility-administered medications for this visit. SYSTEM REVIEW NOT RELATED TO LIVER DISEASE OR REVIEWED ABOVE:  Constitution systems: Negative for fever, chills, weight loss. Weight up a bit from last year. Eyes: Negative for visual changes. ENT: Negative for sore throat, painful swallowing. Respiratory: Negative for cough, hemoptysis, SOB. Cardiology: Negative for chest pain, palpitations. GI:  Negative for constipation or diarrhea. : Negative for urinary frequency, dysuria, hematuria, nocturia. Skin: Negative for rash. Well-healed umbilical scar. Hematology: Negative for easy bruising, blood clots. Musculo-skeletal: Negative for back pain, muscle pain, weakness. Neurologic: Negative for headaches, dizziness, vertigo, memory problems not related to HE. Psychology: Negative for anxiety, depression. FAMILY HISTORY:  The father  of head and neck CA. The mother  of brain cancer. There is no family history of liver disease. SOCIAL HISTORY:  The patient is . The patient has 1 child and 1 grandchild. The patient has never used tobacco products. The patient previously consumed 4-6 alcoholic beverages per day. He states that he has had no alcohol since late 2015. The patient used to work as an  for [email or web address] at [employer]. The patient retired in 2015. PHYSICAL EXAMINATION:  VS: per nursing note. General: No acute distress. Eyes: Sclera anicteric. ENT: No oral lesions. Skin: No rashes. spider angiomata. No jaundice. Abdomen: No obvious distention suggesting ascites. Extremities: No edema. No muscle wasting. Neurologic: Alert and oriented. Cranial nerves grossly intact.     LABORATORY STUDIES:  [facility] [place] [facility] [address] [place] [address] & Units 2021   WBC 3.4 - 10.8 x10E3/uL 6.1 6.6   HGB 13.0 - 17.7 g/dL 16.7 15.0    - 450 x10E3/uL 181 164   INR 0.9 - 1.2 1.2 1.2   AST 0 - 40 IU/L 43 (H) 43 (H)   ALT 0 - 44 IU/L 31 25   Alk Phos 39 - 117 IU/L 86 75   Bili, Total 0.0 - 1.2 mg/dL 1.6 (H) 2.3 (H)   Bili, Direct 0.00 - 0.40 mg/dL 0.53 (H) 0.63 (H)   Albumin 3.8 - 4.8 g/dL 4.0 4.0   BUN 8 - 27 mg/dL 4 (L) 4 (L)   Creat 0.76 - 1.27 mg/dL 0.75 (L) 0.74 (L)   Na 134 - 144 mmol/L 133 (L) 131 (L)   K 3.5 - 5.2 mmol/L 4.5 3.9   Cl 96 - 106 mmol/L 98 96   CO2 20 - 29 mmol/L 23 21   Glucose 65 - 99 mg/dL 101 (H) 95     Liver [facility] Latest Ref Rng & Units 12/12/2019   WBC 3.4 - 10.8 x10E3/uL 5.3   HGB 13.0 - 17.7 g/dL 16.5    - 450 x10E3/uL 177   INR 0.9 - 1.2 1.3 (H)   AST 0 - 40 IU/L 50 (H)   ALT 0 - 44 IU/L 28   Alk Phos 39 - 117 IU/L 71   Bili, Total 0.0 - 1.2 mg/dL 2.5 (H)   Bili, Direct 0.00 - 0.40 mg/dL 0.61 (H)   Albumin 3.8 - 4.8 g/dL 4.0   BUN 8 - 27 mg/dL 6 (L)   Creat 0.76 - 1.27 mg/dL 0.75 (L)   Na 134 - 144 mmol/L 134   K 3.5 - 5.2 mmol/L 5.0   Cl 96 - 106 mmol/L 98   CO2 20 - 29 mmol/L 23   Glucose 65 - 99 mg/dL 83     Cancer Screening Latest Ref Rng & Units 2/25/2021 7/29/2020 12/12/2019   AFP, Serum 0.0 - 8.0 ng/mL 3.5 2.2 1.8   AFP-L3% 0.0 - 9.9 % Comment Comment Comment   Additional lab values drawn at today's office visit are pending at the time of documentation. SEROLOGIES:  Serologies Latest Ref Rng 9/25/2015   Hep A Ab, Total NEGATIVE   NEGATIVE   Hep B Core Ab, Total NEGATIVE   NEGATIVE   Ferritin 26 - 388 NG/ (H)   Iron % Saturation 20 - 50 % 27   Alpha-1 antitrypsin level 90 - 200 mg/dL 165   9/2015. HBsurface antigen negative, Anti-HBsurface negative, anti-HCV negative. LIVER HISTOLOGY:  6/2017. FibroScan performed at [address]. EkPa was 17. Suggested fibrosis level is F3. ENDOSCOPIC PROCEDURES:  9/2015. EGD performed by [facility]. Medium esophageal varices. Banding performed. Mild portal gastropathy. RADIOLOGY:  9/2015. Ultrasound of liver. Echogenic consistent with cirrhosis. No liver mass lesions. No dilated bile ducts. Severe ascites. 1/2016. CT scan abdomen with and without IV contrast.  Changes consistent with cirrhosis. No liver mass lesions. No dilated bile ducts. Small ascites. 5/2016. Ultrasound of liver. Echogenic consistent with cirrhosis. No liver mass lesions. No dilated bile ducts. No ascites. TIPS patent. 11/2016. Ultrasound of liver. Patent TIPS.  No significant ascites. Heterogeneous liver.  5/2017. Ultrasound of liver. Echogenic consistent with cirrhosis. Patent TIPS, no liver mass lesions. No dilated bile ducts. No ascites. 11/2017. Ultrasound of liver. Echogenic consistent with chronic liver disease. Patent TIPS, but with some diminished velocities. No liver mass lesions. No dilated bile ducts. No ascites. 5/2018. Ultrasound of liver. Echogenic consistent with cirrhosis. No liver mass lesions. No dilated bile ducts. No ascites. TIPS patent, appropriate velocities. 11/2018. Ultrasound of liver. Echogenic consistent with cirrhosis. No liver mass lesions. No dilated bile ducts. No ascites. TIPS patent, appropriate velocities. 6/2019. Ultrasound of liver. Echogenic consistent with cirrhosis. No liver mass lesions. No dilated bile ducts. No ascites. TIPS patent, appropriate velocities. 12/2019. Doppler ultrasound of liver. Echogenic consistent with cirrhosis. No liver mass lesions. No dilated bile ducts. No ascites. TIPS patent, appropriate velocities. Pending at the time of office visit. 7/2020. Ultrasound of liver. Echogenic consistent with cirrhosis. No liver mass lesions, incidental liver cysts. No dilated bile ducts. No ascites. TIPS patent, appropriate velocities. 1/2021. Ultrasound of liver. Echogenic consistent with cirrhosis. No liver mass lesions, stable hypoechoic liver cysts. No dilated bile ducts. No ascites. TIPS patent, appropriate velocities. 7/2021. Ultrasound of liver. Pending at the time of office visit. OTHER TESTING:  Not available or performed    ASSESSMENT AND PLAN:  Cirrhosis secondary to alcohol. Last labs show stable liver function dramatically improved since cessation of alcohol. He feels that he is back to his baseline status for strength and stamina.   On the basis of the ongoing improving labs, patient had shown some marked improvement and there is no indication for transplant consideration at this time. Will repeat labs in office today. Ascites and edema have now resolved following placement of TIPS, this is patent on recent US doppler in 1/2021. No indication at present for revision as there is no indication of ascites. Patient has discontinued use of diuretics as of 5/2017 and has had no reaccumulation. I will continue to monitor with US every 6 months. Lower extremity edema has resolved. Esophageal varices without prior bleeding. Varices have been banded. TIPS will treat varices he will not require additional EGD with banding. Follow-up per Dr. Cristino Scheuermann. Hepatic encephalopathy has not developed to date. There is no need for treatment with lactulose and/or Xifaxan at this time. No need to restrict dietary protein at this time. Patient is taking OTC Miralax as needed to maintain bowel regularity. The patient was directed to continue all current medications at the current dosages. There are no contraindications for the patient to take any medications that are necessary for treatment of other medical issues. The patient was counseled regarding alcohol consumption. I have congratulated him on attaining >5 years' sobriety. Vaccination for viral hepatitis A and B is recommended since the patient has no serologic evidence of previous exposure or vaccination with immunity. [address] screening has recently been performed and does not suggest [address] I will follow-up on US report when available. Will otherwise plan on repeat assessment every 6 months. FOLLOW-UP:  All of the issues listed above in the Assessment and Plan were discussed with the patient. All questions were answered. The patient expressed a clear understanding of the above. [address] [address] in 6 months with repeat US of the TIPS at that time.       Edgar Hemphill PA-C  [facility] [place] [address] [place]

## 2021-07-29 LAB
AFP L3 MFR SERPL: NORMAL % (ref 0–9.9)
AFP SERPL-MCNC: 4.1 NG/ML (ref 0–8)
ALBUMIN SERPL-MCNC: 4.1 G/DL (ref 3.8–4.8)
ALP SERPL-CCNC: 106 IU/L (ref 48–121)
ALT SERPL-CCNC: 67 IU/L (ref 0–44)
AST SERPL-CCNC: 88 IU/L (ref 0–40)
BILIRUB DIRECT SERPL-MCNC: 1.17 MG/DL (ref 0–0.4)
BILIRUB SERPL-MCNC: 3.5 MG/DL (ref 0–1.2)
BUN SERPL-MCNC: 5 MG/DL (ref 8–27)
BUN/CREAT SERPL: 6 (ref 10–24)
CALCIUM SERPL-MCNC: 9.2 MG/DL (ref 8.6–10.2)
CHLORIDE SERPL-SCNC: 97 MMOL/L (ref 96–106)
CO2 SERPL-SCNC: 23 MMOL/L (ref 20–29)
CREAT SERPL-MCNC: 0.87 MG/DL (ref 0.76–1.27)
ERYTHROCYTE [DISTWIDTH] IN BLOOD BY AUTOMATED COUNT: 12.3 % (ref 11.6–15.4)
GLUCOSE SERPL-MCNC: 97 MG/DL (ref 65–99)
HCT VFR BLD AUTO: 51.3 % (ref 37.5–51)
HGB BLD-MCNC: 17.9 G/DL (ref 13–17.7)
INR PPP: 1.3 (ref 0.9–1.2)
MCH RBC QN AUTO: 33.4 PG (ref 26.6–33)
MCHC RBC AUTO-ENTMCNC: 34.9 G/DL (ref 31.5–35.7)
MCV RBC AUTO: 96 FL (ref 79–97)
PLATELET # BLD AUTO: 153 X10E3/UL (ref 150–450)
POTASSIUM SERPL-SCNC: 4.3 MMOL/L (ref 3.5–5.2)
PROT SERPL-MCNC: 6.9 G/DL (ref 6–8.5)
PROTHROMBIN TIME: 14 SEC (ref 9.1–12)
RBC # BLD AUTO: 5.36 X10E6/UL (ref 4.14–5.8)
SODIUM SERPL-SCNC: 134 MMOL/L (ref 134–144)
WBC # BLD AUTO: 6 X10E3/UL (ref 3.4–10.8)

## 2021-07-30 ENCOUNTER — PATIENT MESSAGE (OUTPATIENT)
Dept: HEMATOLOGY | Age: 66
End: 2021-07-30

## 2021-07-30 DIAGNOSIS — K70.30 ALCOHOLIC CIRRHOSIS OF LIVER WITHOUT ASCITES (HCC): Primary | ICD-10-CM

## 2021-07-30 DIAGNOSIS — Z95.828 S/P TIPS (TRANSJUGULAR INTRAHEPATIC PORTOSYSTEMIC SHUNT): ICD-10-CM

## 2021-07-30 NOTE — TELEPHONE ENCOUNTER
Mailed lab order to patient per NANCY Madrigal            ----- Message from Andre Yarbrough, [address] sent at 7/30/2021 11:17 AM EDT -----  Regarding: send lab order  Please mail the lab order dated 7/30 to patient home address. Thanks.

## 2021-07-30 NOTE — PROGRESS NOTES
Pt notified via [facility] of bump in enzymes and bili,?source. Will send lab order for recheck in 6 wks to trend.

## 2021-09-14 LAB
ALBUMIN SERPL-MCNC: 3.3 G/DL (ref 3.8–4.8)
ALP SERPL-CCNC: 99 IU/L (ref 44–121)
ALT SERPL-CCNC: 28 IU/L (ref 0–44)
AST SERPL-CCNC: 44 IU/L (ref 0–40)
BILIRUB DIRECT SERPL-MCNC: 0.65 MG/DL (ref 0–0.4)
BILIRUB SERPL-MCNC: 1.8 MG/DL (ref 0–1.2)
BUN SERPL-MCNC: 7 MG/DL (ref 8–27)
BUN/CREAT SERPL: 9 (ref 10–24)
CALCIUM SERPL-MCNC: 8.8 MG/DL (ref 8.6–10.2)
CHLORIDE SERPL-SCNC: 99 MMOL/L (ref 96–106)
CO2 SERPL-SCNC: 25 MMOL/L (ref 20–29)
CREAT SERPL-MCNC: 0.79 MG/DL (ref 0.76–1.27)
ERYTHROCYTE [DISTWIDTH] IN BLOOD BY AUTOMATED COUNT: 12.3 % (ref 11.6–15.4)
GLUCOSE SERPL-MCNC: 93 MG/DL (ref 65–99)
HCT VFR BLD AUTO: 48.2 % (ref 37.5–51)
HGB BLD-MCNC: 17 G/DL (ref 13–17.7)
INR PPP: 1.3 (ref 0.9–1.2)
MCH RBC QN AUTO: 34.3 PG (ref 26.6–33)
MCHC RBC AUTO-ENTMCNC: 35.3 G/DL (ref 31.5–35.7)
MCV RBC AUTO: 97 FL (ref 79–97)
PLATELET # BLD AUTO: 178 X10E3/UL (ref 150–450)
POTASSIUM SERPL-SCNC: 4.5 MMOL/L (ref 3.5–5.2)
PROT SERPL-MCNC: 6 G/DL (ref 6–8.5)
PROTHROMBIN TIME: 13 SEC (ref 9.1–12)
RBC # BLD AUTO: 4.96 X10E6/UL (ref 4.14–5.8)
SODIUM SERPL-SCNC: 134 MMOL/L (ref 134–144)
WBC # BLD AUTO: 5.7 X10E3/UL (ref 3.4–10.8)

## 2022-01-26 ENCOUNTER — HOSPITAL ENCOUNTER (OUTPATIENT)
Dept: ULTRASOUND IMAGING | Age: 67
Discharge: HOME | End: 2022-01-26
Attending: PHYSICIAN ASSISTANT
Payer: MEDICARE

## 2022-01-26 ENCOUNTER — OFFICE VISIT (OUTPATIENT)
Dept: HEMATOLOGY | Age: 67
End: 2022-01-26
Payer: MEDICARE

## 2022-01-26 VITALS
HEIGHT: 75 IN | DIASTOLIC BLOOD PRESSURE: 79 MMHG | RESPIRATION RATE: 16 BRPM | OXYGEN SATURATION: 97 % | HEART RATE: 71 BPM | BODY MASS INDEX: 30.71 KG/M2 | TEMPERATURE: 97 F | WEIGHT: 247 LBS | SYSTOLIC BLOOD PRESSURE: 124 MMHG

## 2022-01-26 DIAGNOSIS — K70.30 ALCOHOLIC CIRRHOSIS OF LIVER WITHOUT ASCITES (HCC): ICD-10-CM

## 2022-01-26 DIAGNOSIS — Z95.828 S/P TIPS (TRANSJUGULAR INTRAHEPATIC PORTOSYSTEMIC SHUNT): Primary | ICD-10-CM

## 2022-01-26 PROCEDURE — 76705 ECHO EXAM OF ABDOMEN: CPT

## 2022-01-26 PROCEDURE — G8432 DEP SCR NOT DOC, RNG: HCPCS | Performed by: PHYSICIAN ASSISTANT

## 2022-01-26 PROCEDURE — 99214 OFFICE O/P EST MOD 30 MIN: CPT | Performed by: PHYSICIAN ASSISTANT

## 2022-01-26 PROCEDURE — 3017F COLORECTAL CA SCREEN DOC REV: CPT | Performed by: PHYSICIAN ASSISTANT

## 2022-01-26 PROCEDURE — 1101F PT FALLS ASSESS-DOCD LE1/YR: CPT | Performed by: PHYSICIAN ASSISTANT

## 2022-01-26 PROCEDURE — G8417 CALC BMI ABV UP PARAM F/U: HCPCS | Performed by: PHYSICIAN ASSISTANT

## 2022-01-26 PROCEDURE — G0463 HOSPITAL OUTPT CLINIC VISIT: HCPCS | Performed by: PHYSICIAN ASSISTANT

## 2022-01-26 PROCEDURE — G8427 DOCREV CUR MEDS BY ELIG CLIN: HCPCS | Performed by: PHYSICIAN ASSISTANT

## 2022-01-26 PROCEDURE — 93976 VASCULAR STUDY: CPT

## 2022-01-26 PROCEDURE — G8536 NO DOC ELDER MAL SCRN: HCPCS | Performed by: PHYSICIAN ASSISTANT

## 2022-01-26 NOTE — PROGRESS NOTES
Identified pt with two pt identifiers(name and ). Reviewed record in preparation for visit and have obtained necessary documentation. Chief Complaint   Patient presents with    Cirrhosis Of Liver     6mo f/u      Vitals:    22 1114   BP: 124/79   Pulse: 71   Resp: 16   Temp: 97 °F (36.1 °C)   TempSrc: Temporal   SpO2: 97%   Weight: 247 lb (112 kg)   Height: 6' 3\" (1.905 m)   PainSc:   0 - No pain       Health Maintenance Review: Patient reminded of \"due or due soon\" health maintenance. I have asked the patient to contact his/her primary care provider (PCP) for follow-up on his/her health maintenance. Coordination of Care Questionnaire:  :   1) Have you been to an emergency room, urgent care, or hospitalized since your last visit? If yes, where when, and reason for visit? no       2. Have seen or consulted any other health care provider since your last visit? If yes, where when, and reason for visit? NO      Patient is accompanied by self I have received verbal consent from Robson Zapata to discuss any/all medical information while they are present in the room.

## 2022-01-27 LAB
ALBUMIN SERPL-MCNC: 3.5 G/DL (ref 3.5–5)
ALBUMIN/GLOB SERPL: 1.1 {RATIO} (ref 1.1–2.2)
ALP SERPL-CCNC: 100 U/L (ref 45–117)
ALT SERPL-CCNC: 41 U/L (ref 12–78)
ANION GAP SERPL CALC-SCNC: 2 MMOL/L (ref 5–15)
AST SERPL-CCNC: 44 U/L (ref 15–37)
BILIRUB DIRECT SERPL-MCNC: 0.8 MG/DL (ref 0–0.2)
BILIRUB SERPL-MCNC: 2.1 MG/DL (ref 0.2–1)
BUN SERPL-MCNC: 6 MG/DL (ref 6–20)
BUN/CREAT SERPL: 7 (ref 12–20)
CALCIUM SERPL-MCNC: 9.3 MG/DL (ref 8.5–10.1)
CHLORIDE SERPL-SCNC: 103 MMOL/L (ref 97–108)
CO2 SERPL-SCNC: 31 MMOL/L (ref 21–32)
CREAT SERPL-MCNC: 0.84 MG/DL (ref 0.7–1.3)
ERYTHROCYTE [DISTWIDTH] IN BLOOD BY AUTOMATED COUNT: 14 % (ref 11.5–14.5)
GLOBULIN SER CALC-MCNC: 3.2 G/DL (ref 2–4)
GLUCOSE SERPL-MCNC: 150 MG/DL (ref 65–100)
HCT VFR BLD AUTO: 51.1 % (ref 36.6–50.3)
HGB BLD-MCNC: 16.9 G/DL (ref 12.1–17)
INR PPP: 1.3 (ref 0.9–1.1)
MCH RBC QN AUTO: 34.6 PG (ref 26–34)
MCHC RBC AUTO-ENTMCNC: 33.1 G/DL (ref 30–36.5)
MCV RBC AUTO: 104.5 FL (ref 80–99)
NRBC # BLD: 0 K/UL (ref 0–0.01)
NRBC BLD-RTO: 0 PER 100 WBC
PLATELET # BLD AUTO: 187 K/UL (ref 150–400)
PMV BLD AUTO: 10.5 FL (ref 8.9–12.9)
POTASSIUM SERPL-SCNC: 4.7 MMOL/L (ref 3.5–5.1)
PROT SERPL-MCNC: 6.7 G/DL (ref 6.4–8.2)
PROTHROMBIN TIME: 13.2 SEC (ref 9–11.1)
RBC # BLD AUTO: 4.89 M/UL (ref 4.1–5.7)
SODIUM SERPL-SCNC: 136 MMOL/L (ref 136–145)
TIPS PORTAL VEIN INFLOW PSV: 26 CM/S
TIPS STENT HEPATIC END PSV: 67 CM/S
TIPS STENT MID PSV: 83 CM/S
TIPS STENT PORTAL END PSV: 100 CM/S
WBC # BLD AUTO: 6.1 K/UL (ref 4.1–11.1)

## 2022-01-28 DIAGNOSIS — K70.30 ALCOHOLIC CIRRHOSIS OF LIVER WITHOUT ASCITES (HCC): ICD-10-CM

## 2022-01-28 DIAGNOSIS — Z95.828 S/P TIPS (TRANSJUGULAR INTRAHEPATIC PORTOSYSTEMIC SHUNT): Primary | ICD-10-CM

## 2022-01-28 LAB
AFP L3 MFR SERPL: NORMAL % (ref 0–9.9)
AFP SERPL-MCNC: 3.6 NG/ML (ref 0–8)

## 2022-01-28 NOTE — PROGRESS NOTES
Pt notified of stable lab findings and US/TIPS ok. Will await AFP and notify if any rise. Follow-up in 6 mo as planned.

## 2022-06-23 NOTE — PROGRESS NOTES
Pt notified via [address] of stable lab findings, resolution of elevation in bili and enzymes in late 7//2021,?etio. Follow-up as scheduled in 1/2022. Admission

## 2022-07-27 ENCOUNTER — HOSPITAL ENCOUNTER (OUTPATIENT)
Dept: ULTRASOUND IMAGING | Age: 67
Discharge: HOME | End: 2022-07-27
Attending: PHYSICIAN ASSISTANT
Payer: MEDICARE

## 2022-07-27 ENCOUNTER — OFFICE VISIT (OUTPATIENT)
Dept: HEMATOLOGY | Age: 67
End: 2022-07-27
Payer: MEDICARE

## 2022-07-27 VITALS
TEMPERATURE: 98 F | OXYGEN SATURATION: 99 % | HEIGHT: 75 IN | WEIGHT: 233.2 LBS | SYSTOLIC BLOOD PRESSURE: 134 MMHG | HEART RATE: 70 BPM | BODY MASS INDEX: 29 KG/M2 | DIASTOLIC BLOOD PRESSURE: 83 MMHG

## 2022-07-27 DIAGNOSIS — Z95.828 S/P TIPS (TRANSJUGULAR INTRAHEPATIC PORTOSYSTEMIC SHUNT): ICD-10-CM

## 2022-07-27 DIAGNOSIS — K70.30 ALCOHOLIC CIRRHOSIS OF LIVER WITHOUT ASCITES (HCC): ICD-10-CM

## 2022-07-27 DIAGNOSIS — Z95.828 S/P TIPS (TRANSJUGULAR INTRAHEPATIC PORTOSYSTEMIC SHUNT): Primary | ICD-10-CM

## 2022-07-27 PROCEDURE — 76705 ECHO EXAM OF ABDOMEN: CPT

## 2022-07-27 PROCEDURE — G0463 HOSPITAL OUTPT CLINIC VISIT: HCPCS | Performed by: PHYSICIAN ASSISTANT

## 2022-07-27 PROCEDURE — G8432 DEP SCR NOT DOC, RNG: HCPCS | Performed by: PHYSICIAN ASSISTANT

## 2022-07-27 PROCEDURE — 3017F COLORECTAL CA SCREEN DOC REV: CPT | Performed by: PHYSICIAN ASSISTANT

## 2022-07-27 PROCEDURE — G8427 DOCREV CUR MEDS BY ELIG CLIN: HCPCS | Performed by: PHYSICIAN ASSISTANT

## 2022-07-27 PROCEDURE — G8417 CALC BMI ABV UP PARAM F/U: HCPCS | Performed by: PHYSICIAN ASSISTANT

## 2022-07-27 PROCEDURE — 99214 OFFICE O/P EST MOD 30 MIN: CPT | Performed by: PHYSICIAN ASSISTANT

## 2022-07-27 PROCEDURE — G8536 NO DOC ELDER MAL SCRN: HCPCS | Performed by: PHYSICIAN ASSISTANT

## 2022-07-27 PROCEDURE — 93976 VASCULAR STUDY: CPT

## 2022-07-27 PROCEDURE — 1101F PT FALLS ASSESS-DOCD LE1/YR: CPT | Performed by: PHYSICIAN ASSISTANT

## 2022-07-27 PROCEDURE — 1123F ACP DISCUSS/DSCN MKR DOCD: CPT | Performed by: PHYSICIAN ASSISTANT

## 2022-07-27 NOTE — PROGRESS NOTES
[facility], MD, MD Henri Sorto PA-C Janina Brasil, [facility]     April Francetta Bal, NP Rip Councilman, NP Cindra Kallman, NP [facility]    at [address], [id number] [place] [address]    [facility] [facility].    [phone]    FAX: [address] [place] [address], [address]    [phone]    FAX: [phone]       Patient Care Team:  Nohelia Dorantes MD as PCP - General ([facility])  Nohelia Dorantes MD (General Practice)      Problem List  Date Reviewed: 1/26/2022            Codes Class Noted    Ascites ICD-10-CM: R18.8  ICD-9-CM: 789.59  12/26/2015        S/P TIPS (transjugular intrahepatic portosystemic shunt) ICD-10-CM: L66.222  ICD-9-CM: V45.89  12/26/2015        Cirrhosis, alcoholic ([facility] [address] QQJ-75-AE: K70.30  ICD-9-CM: 571.2  10/6/2015        Hyponatremia ICD-10-CM: E87.1  ICD-9-CM: 276.1  9/24/2015          Max Isabel returns to the [address] for management of 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 The active problem list, all pertinent past medical history, medications, endoscopic studies, radiologic findings and laboratory findings related to the liver disorder were reviewed with the patient. The patient is a 78 y.o.  male who was first found to have cirrhosis in 9/2015 when developed ascites. Ascites became refractory to diuretics because of hyponatremia. The patient underwent TIPS for treatment of refactory ascites in 12/2015. He has done extremely since placement of the TIPS and has had no complication of this procedure. He has continued to have relatively low sodium levels despite discontinuation of diuretics.      Edema has resolved with diuretics. Patient has done so well with resolution of edema that he discontinued use of all diuretics as of ~5/2017. He has noted no increase in fluid and has had an improvement in muscle cramping symptoms and subsequent improved sleeping patterns. He is continuing to watch his sodium/fluid intake carefully. The patient has esophageal varices without bleeding. Varices have been treated with band ligation and now TIPS. Recent US of the liver in 1/2022 showed that this was patent and the liver without mass. Additional study from this morning is pending at the time of office visit. He has never had issue with HE following the TIPS and states that he remains regular with bowel output. The patient completes all daily activities without any functional limitations. The patient has not experienced fatigue, fevers, chills, pain in the right side over the liver, problems concentrating, swelling of the abdomen, swelling of the lower extremities, hematemesis, hematochezia. He was found to have elevation of PSA to 11 in early 7/2022 and was treated with a long course of antibiotics which he has finished and will be following up with urology in late 8/2022. He is without symptoms. Over the course of the past 4 months he has had ~25# intentional weight loss. He had labs with PCP in early 7/2022 and this showed a marked elevation in enzymes and bilirubin, no clear underlying etiology. He denies viral illness, vaccination, change in medications or alcohol exposure. ALLERGIES  Allergies   Allergen Reactions    Erythromycin Other (comments) and Rash     Headache, n/v, diarrhea  Headache, n/v, diarrhea      Lipitor [Atorvastatin] Cough and Shortness of Breath    Pravastatin Cough and Shortness of Breath    Azithromycin Diarrhea and Palpitations       MEDICATIONS  Current Outpatient Medications   Medication Sig    multivitamin (ONE A DAY) tablet Take 1 Tablet by mouth daily.     metoprolol tartrate (LOPRESSOR) 25 mg tablet Take  by mouth two (2) times a day. losartan (COZAAR) 50 mg tablet Take 50 mg by mouth daily. No current facility-administered medications for this visit. SYSTEM REVIEW NOT RELATED TO LIVER DISEASE OR REVIEWED ABOVE:  Constitution systems: Negative for fever, chills, weight loss. Weight down ~20+#. Eyes: Negative for visual changes. ENT: Negative for sore throat, painful swallowing. Respiratory: Negative for cough, hemoptysis, SOB. Cardiology: Negative for chest pain, palpitations. GI:  Negative for constipation or diarrhea. : Negative for urinary frequency, dysuria, hematuria, nocturia. Skin: Negative for rash. Well-healed umbilical scar. Hematology: Negative for easy bruising, blood clots. Musculo-skeletal: Negative for back pain, muscle pain, weakness. Neurologic: Negative for headaches, dizziness, vertigo, memory problems not related to HE. Psychology: Negative for anxiety, depression. FAMILY HISTORY:  The father  of head and neck CA. The mother  of brain cancer. There is no family history of liver disease. SOCIAL HISTORY:  The patient is . The patient has 1 child and 1 grandchild. The patient has never used tobacco products. The patient previously consumed 4-6 alcoholic beverages per day. He states that he has had no alcohol since late 2015. The patient used to work as an  for [employer] at [employer]. The patient retired in 2015. PHYSICAL EXAMINATION:  VS: per nursing note. General: No acute distress. Eyes: Sclera anicteric. ENT: No oral lesions. Skin: No rashes. spider angiomata. No jaundice. Abdomen: No obvious distention suggesting ascites. Extremities: No edema. No muscle wasting. Neurologic: Alert and oriented. Cranial nerves grossly intact.     LABORATORY STUDIES:  From 2022  AST/ALT/ALP/T Bili/ALB: 164/62/97/4.7/3.8  WBC/HB/PLT/INR:5.3/16.2/136  NA/BUN/CREAT: 131/5/0.77    Liver [facility] [address] Ref Rng & Units 1/26/2022 9/13/2021   WBC 4.1 - 11.1 K/uL 6.1 5.7   HGB 12.1 - 17.0 g/dL 16.9 17.0    - 400 K/uL 187 178   INR 0.9 - 1.1   1.3 (H) 1.3 (H)   AST 15 - 37 U/L 44 (H) 44 (H)   ALT 12 - 78 U/L 41 28   Alk Phos 45 - 117 U/L 100 99   Bili, Total 0.2 - 1.0 MG/DL 2.1 (H) 1.8 (H)   Bili, Direct 0.0 - 0.2 MG/DL 0.8 (H) 0.65 (H)   Albumin 3.5 - 5.0 g/dL 3.5 3.3 (L)   BUN 6 - 20 MG/DL 6 7 (L)   Creat 0.70 - 1.30 MG/DL 0.84 0.79   Na 136 - 145 mmol/L 136 134   K 3.5 - 5.1 mmol/L 4.7 4.5   Cl 97 - 108 mmol/L 103 99   CO2 21 - 32 mmol/L 31 25   Glucose 65 - 100 mg/dL 150 (H) 93     Liver [address] Ref Rng & Units 7/28/2021   WBC 4.1 - 11.1 K/uL 6.0   HGB 12.1 - 17.0 g/dL 17.9 (H)    - 400 K/uL 153   INR 0.9 - 1.1   1.3 (H)   AST 15 - 37 U/L 88 (H)   ALT 12 - 78 U/L 67 (H)   Alk Phos 45 - 117 U/L 106   Bili, Total 0.2 - 1.0 MG/DL 3.5 (H)   Bili, Direct 0.0 - 0.2 MG/DL 1.17 (H)   Albumin 3.5 - 5.0 g/dL 4.1   BUN 6 - 20 MG/DL 5 (L)   Creat 0.70 - 1.30 MG/DL 0.87   Na 136 - 145 mmol/L 134   K 3.5 - 5.1 mmol/L 4.3   Cl 97 - 108 mmol/L 97   CO2 21 - 32 mmol/L 23   Glucose 65 - 100 mg/dL 97     Cancer Screening Latest Ref Rng & Units 2/25/2021 7/29/2020 12/12/2019   AFP, Serum 0.0 - 8.0 ng/mL 3.5 2.2 1.8   AFP-L3% 0.0 - 9.9 % Comment Comment Comment   Additional lab values drawn at today's office visit are pending at the time of documentation. SEROLOGIES:  Serologies Latest Ref Rng 9/25/2015   Hep A Ab, Total NEGATIVE   NEGATIVE   Hep B Core Ab, Total NEGATIVE   NEGATIVE   Ferritin 26 - 388 NG/ (H)   Iron % Saturation 20 - 50 % 27   Alpha-1 antitrypsin level 90 - 200 mg/dL 165   9/2015. HBsurface antigen negative, Anti-HBsurface negative, anti-HCV negative. LIVER HISTOLOGY:  6/2017. FibroScan performed at [address]. EkPa was 17. Suggested fibrosis level is F3. ENDOSCOPIC PROCEDURES:  9/2015.   EGD performed by MLS. Medium esophageal varices. Banding performed. Mild portal gastropathy. RADIOLOGY:  9/2015. Ultrasound of liver. Echogenic consistent with cirrhosis. No liver mass lesions. No dilated bile ducts. Severe ascites. 1/2016. CT scan abdomen with and without IV contrast.  Changes consistent with cirrhosis. No liver mass lesions. No dilated bile ducts. Small ascites. 5/2016. Ultrasound of liver. Echogenic consistent with cirrhosis. No liver mass lesions. No dilated bile ducts. No ascites. TIPS patent. 11/2016. Ultrasound of liver. Patent TIPS. No significant ascites. Heterogeneous liver.  5/2017. Ultrasound of liver. Echogenic consistent with cirrhosis. Patent TIPS, no liver mass lesions. No dilated bile ducts. No ascites. 11/2017. Ultrasound of liver. Echogenic consistent with chronic liver disease. Patent TIPS, but with some diminished velocities. No liver mass lesions. No dilated bile ducts. No ascites. 5/2018. Ultrasound of liver. Echogenic consistent with cirrhosis. No liver mass lesions. No dilated bile ducts. No ascites. TIPS patent, appropriate velocities. 11/2018. Ultrasound of liver. Echogenic consistent with cirrhosis. No liver mass lesions. No dilated bile ducts. No ascites. TIPS patent, appropriate velocities. 6/2019. Ultrasound of liver. Echogenic consistent with cirrhosis. No liver mass lesions. No dilated bile ducts. No ascites. TIPS patent, appropriate velocities. 12/2019. Doppler ultrasound of liver. Echogenic consistent with cirrhosis. No liver mass lesions. No dilated bile ducts. No ascites. TIPS patent, appropriate velocities. Pending at the time of office visit. 7/2020. Ultrasound of liver. Echogenic consistent with cirrhosis. No liver mass lesions, incidental liver cysts. No dilated bile ducts. No ascites. TIPS patent, appropriate velocities. 1/2021. Ultrasound of liver. Echogenic consistent with cirrhosis. No liver mass lesions, stable hypoechoic liver cysts. No dilated bile ducts. No ascites. TIPS patent, appropriate velocities. 7/2021. Ultrasound of liver. Echogenic consistent with cirrhosis. No liver mass lesions, stable hypoechoic liver cysts. No dilated bile ducts. No ascites. TIPS patent, appropriate velocities. 1/2022. Ultrasound of liver. Pending at the time of office visit. 7/2022. Ultrasound of liver. No apparent hepatic mass. Patent TIPS. OTHER TESTING:  Not available or performed    ASSESSMENT AND PLAN:  Cirrhosis secondary to alcohol. Last labs show stable liver function dramatically improved since cessation of alcohol. He feels that he is back to his baseline status for strength and stamina. On the basis of the ongoing improving labs, patient had shown some marked improvement and there is no indication for transplant consideration at this time. Review of recent labs show that he had markedly elevated liver enzymes in early 7/2022, unclear etiology and no changes in health status or medications. He has always had an elevation in the bilirubin thought to be Gilbert's as this is largely indirect. Will repeat labs in office today to assess if this may have be reactive to acute viral illness of suggestive of other process. Ascites and edema have now resolved following placement of TIPS, this is patent on recent US doppler in 7/2021. I will follow-up on today's study when available, but there has been no indication for revision as there is no indication of ascites. Patient has discontinued use of diuretics as of 5/2017 and has had no reaccumulation. I will continue to monitor with US every 6 months. Lower extremity edema has resolved. Esophageal varices without prior bleeding. Varices have been banded. TIPS will treat varices he will not require additional EGD with banding. Follow-up per Dr. Latonia Stevenson. Hepatic encephalopathy has not developed to date.   There is no need for treatment with lactulose and/or Xifaxan at this time. No need to restrict dietary protein at this time. Patient is taking OTC Miralax as needed to maintain bowel regularity. The patient was directed to continue all current medications at the current dosages. There are no contraindications for the patient to take any medications that are necessary for treatment of other medical issues. The patient was counseled regarding alcohol consumption. I have congratulated him on attaining >5 years' sobriety. Vaccination for viral hepatitis A and B is recommended since the patient has no serologic evidence of previous exposure or vaccination with immunity. [facility] [address] screening has recently been performed and does not suggest [address] I will follow-up on US report when available. Will otherwise plan on repeat assessment every 6 months. FOLLOW-UP:  All of the issues listed above in the Assessment and Plan were discussed with the patient. All questions were answered. The patient expressed a clear understanding of the above. [facility] 87 in 6 months with repeat US of the TIPS at that time. I will advise patient of repeat labs values today when available to me.        Wendy Mcgrath PA-C  Liver [place] [facility] 59, [facility] 22.  [phone]  [address] [place] [address]

## 2022-07-27 NOTE — PROGRESS NOTES
Identified pt with two pt identifiers(name and ). Reviewed record in preparation for visit and have obtained necessary documentation. Chief Complaint   Patient presents with    Cirrhosis Of Liver     6month follow up with Joo Jara:    22 1130   BP: 134/83   Pulse: 70   Temp: 98 °F (36.7 °C)   TempSrc: Temporal   SpO2: 99%   Weight: 233 lb 3.2 oz (105.8 kg)   Height: 6' 3\" (1.905 m)   PainSc:   0 - No pain       Health Maintenance Review: Patient reminded of \"due or due soon\" health maintenance. I have asked the patient to contact his/her primary care provider (PCP) for follow-up on his/her health maintenance. Coordination of Care Questionnaire:  :   1) Have you been to an emergency room, urgent care, or hospitalized since your last visit? If yes, where when, and reason for visit? no       2. Have seen or consulted any other health care provider since your last visit? If yes, where when, and reason for visit? NO      Patient is accompanied by self I have received verbal consent from Desirae Li to discuss any/all medical information while they are present in the room.

## 2022-07-28 LAB
ALBUMIN SERPL-MCNC: 4.1 G/DL (ref 3.8–4.8)
ALP SERPL-CCNC: 123 IU/L (ref 44–121)
ALT SERPL-CCNC: 37 IU/L (ref 0–44)
AST SERPL-CCNC: 74 IU/L (ref 0–40)
BILIRUB DIRECT SERPL-MCNC: 1.67 MG/DL (ref 0–0.4)
BILIRUB SERPL-MCNC: 3.6 MG/DL (ref 0–1.2)
BUN SERPL-MCNC: 6 MG/DL (ref 8–27)
BUN/CREAT SERPL: 8 (ref 10–24)
CALCIUM SERPL-MCNC: 8.7 MG/DL (ref 8.6–10.2)
CHLORIDE SERPL-SCNC: 94 MMOL/L (ref 96–106)
CO2 SERPL-SCNC: 24 MMOL/L (ref 20–29)
CREAT SERPL-MCNC: 0.74 MG/DL (ref 0.76–1.27)
EGFR: 100 ML/MIN/1.73
ERYTHROCYTE [DISTWIDTH] IN BLOOD BY AUTOMATED COUNT: 12.3 % (ref 11.6–15.4)
GLUCOSE SERPL-MCNC: 99 MG/DL (ref 65–99)
HCT VFR BLD AUTO: 45.9 % (ref 37.5–51)
HGB BLD-MCNC: 16.7 G/DL (ref 13–17.7)
INR PPP: 1.4 (ref 0.9–1.2)
MCH RBC QN AUTO: 35 PG (ref 26.6–33)
MCHC RBC AUTO-ENTMCNC: 36.4 G/DL (ref 31.5–35.7)
MCV RBC AUTO: 96 FL (ref 79–97)
PLATELET # BLD AUTO: 155 X10E3/UL (ref 150–450)
POTASSIUM SERPL-SCNC: 4.6 MMOL/L (ref 3.5–5.2)
PROT SERPL-MCNC: 6.4 G/DL (ref 6–8.5)
PROTHROMBIN TIME: 14 SEC (ref 9.1–12)
RBC # BLD AUTO: 4.77 X10E6/UL (ref 4.14–5.8)
SODIUM SERPL-SCNC: 131 MMOL/L (ref 134–144)
TIPS PORTAL VEIN INFLOW PSV: 32 CM/S
TIPS STENT HEPATIC END PSV: 75 CM/S
TIPS STENT MID PSV: 81 CM/S
TIPS STENT PORTAL END PSV: 142 CM/S
WBC # BLD AUTO: 5.5 X10E3/UL (ref 3.4–10.8)

## 2022-08-02 ENCOUNTER — TELEPHONE (OUTPATIENT)
Dept: HEMATOLOGY | Age: 67
End: 2022-08-02

## 2022-08-02 DIAGNOSIS — K70.30 ALCOHOLIC CIRRHOSIS OF LIVER WITHOUT ASCITES (HCC): ICD-10-CM

## 2022-08-02 DIAGNOSIS — Z95.828 S/P TIPS (TRANSJUGULAR INTRAHEPATIC PORTOSYSTEMIC SHUNT): Primary | ICD-10-CM

## 2022-08-02 NOTE — PROGRESS NOTES
Pt notified of findings, liver enzymes/bili are higher than have been seen in past but improved from outside labs 6/2022, unclear etiology of elevation. No recent illness, med change or vaccine. No RUQ pain and no stones on imaging. Will follow-up on pending AFP when available. US without mass/lesion and TIPS patent. Will send lab req for repeat labs in 4-5 weeks to trend.

## 2022-08-03 LAB
AFP L3 MFR SERPL: NORMAL % (ref 0–9.9)
AFP SERPL-MCNC: 6.1 NG/ML (ref 0–8.4)

## 2022-09-07 LAB
ALBUMIN SERPL-MCNC: 3.2 G/DL (ref 3.8–4.8)
ALP SERPL-CCNC: 95 IU/L (ref 44–121)
ALT SERPL-CCNC: 24 IU/L (ref 0–44)
AST SERPL-CCNC: 47 IU/L (ref 0–40)
BILIRUB DIRECT SERPL-MCNC: 0.92 MG/DL (ref 0–0.4)
BILIRUB SERPL-MCNC: 2.5 MG/DL (ref 0–1.2)
BUN SERPL-MCNC: 6 MG/DL (ref 8–27)
BUN/CREAT SERPL: 8 (ref 10–24)
CALCIUM SERPL-MCNC: 9.2 MG/DL (ref 8.6–10.2)
CHLORIDE SERPL-SCNC: 99 MMOL/L (ref 96–106)
CO2 SERPL-SCNC: 25 MMOL/L (ref 20–29)
CREAT SERPL-MCNC: 0.75 MG/DL (ref 0.76–1.27)
EGFR: 100 ML/MIN/1.73
ERYTHROCYTE [DISTWIDTH] IN BLOOD BY AUTOMATED COUNT: 11.8 % (ref 11.6–15.4)
GLUCOSE SERPL-MCNC: 97 MG/DL (ref 65–99)
HCT VFR BLD AUTO: 44.1 % (ref 37.5–51)
HGB BLD-MCNC: 16.7 G/DL (ref 13–17.7)
INR PPP: 1.4 (ref 0.9–1.2)
MCH RBC QN AUTO: 35.2 PG (ref 26.6–33)
MCHC RBC AUTO-ENTMCNC: 37.9 G/DL (ref 31.5–35.7)
MCV RBC AUTO: 93 FL (ref 79–97)
MORPHOLOGY BLD-IMP: ABNORMAL
PLATELET # BLD AUTO: 160 X10E3/UL (ref 150–450)
POTASSIUM SERPL-SCNC: 4.6 MMOL/L (ref 3.5–5.2)
PROT SERPL-MCNC: 5.7 G/DL (ref 6–8.5)
PROTHROMBIN TIME: 14.4 SEC (ref 9.1–12)
RBC # BLD AUTO: 4.75 X10E6/UL (ref 4.14–5.8)
SODIUM SERPL-SCNC: 135 MMOL/L (ref 134–144)
WBC # BLD AUTO: 5.4 X10E3/UL (ref 3.4–10.8)

## 2022-09-09 NOTE — PROGRESS NOTES
Pt notified via Néstor W Ilia Reyes letter of values, improvement in liver enzymes and bilirubin on repeat labs, MELD is 16 though. Will continue to trend. Repeat labs and US as planned in 1/2023 and pt to contact me with any new issues.

## 2023-01-18 ENCOUNTER — TELEPHONE (OUTPATIENT)
Dept: HEMATOLOGY | Age: 68
End: 2023-01-18

## 2023-01-18 NOTE — TELEPHONE ENCOUNTER
Patient is scheduled for MRI. \"I need info on the device he has (shunt/tip) to verify if it's compatible with our 3T MRI machine. \"

## 2023-01-20 NOTE — TELEPHONE ENCOUNTER
[email or web address]:    Please call  Received: NANCY Oneal RN  Caller: Unspecified (2 days ago,  4:23 PM)  I have tried to call this phone number listed and it is [facility] and the office was closed. Can you call the number to try to get through or call central scheduling to notify. There is no issue with having an MRI with the TIPS. Thanks. Called in spoke w/Eva from [facility] office concerning having a MRI w/TIPS in place. Joyce Fitzpatrick has no issues with having MRI with a TIPS. Eva verbalize understanding and patient will be scheduled. (KF)

## 2023-01-30 DIAGNOSIS — K70.30 ALCOHOLIC CIRRHOSIS OF LIVER WITHOUT ASCITES (HCC): ICD-10-CM

## 2023-01-30 DIAGNOSIS — Z95.828 S/P TIPS (TRANSJUGULAR INTRAHEPATIC PORTOSYSTEMIC SHUNT): Primary | ICD-10-CM

## 2023-02-02 ENCOUNTER — DOCUMENTATION ONLY (OUTPATIENT)
Dept: HEMATOLOGY | Age: 68
End: 2023-02-02

## 2023-02-02 DIAGNOSIS — K70.30 ALCOHOLIC CIRRHOSIS OF LIVER WITHOUT ASCITES (HCC): ICD-10-CM

## 2023-02-02 DIAGNOSIS — Z95.828 S/P TIPS (TRANSJUGULAR INTRAHEPATIC PORTOSYSTEMIC SHUNT): Primary | ICD-10-CM

## 2023-02-10 ENCOUNTER — HOSPITAL ENCOUNTER (OUTPATIENT)
Dept: ULTRASOUND IMAGING | Age: 68
Discharge: HOME | End: 2023-02-10
Attending: PHYSICIAN ASSISTANT
Payer: MEDICARE

## 2023-02-10 ENCOUNTER — HOSPITAL ENCOUNTER (OUTPATIENT)
Dept: ULTRASOUND IMAGING | Age: 68
End: 2023-02-10
Attending: PHYSICIAN ASSISTANT
Payer: MEDICARE

## 2023-02-10 DIAGNOSIS — K70.30 ALCOHOLIC CIRRHOSIS OF LIVER WITHOUT ASCITES (HCC): ICD-10-CM

## 2023-02-10 DIAGNOSIS — Z95.828 S/P TIPS (TRANSJUGULAR INTRAHEPATIC PORTOSYSTEMIC SHUNT): ICD-10-CM

## 2023-02-10 LAB
TIPS PORTAL VEIN INFLOW PSV: 53 CM/S
TIPS STENT HEPATIC END PSV: 96.4 CM/S
TIPS STENT MID PSV: 98.6 CM/S
TIPS STENT PORTAL END PSV: 178 CM/S

## 2023-02-10 PROCEDURE — 76705 ECHO EXAM OF ABDOMEN: CPT

## 2023-02-10 PROCEDURE — 93976 VASCULAR STUDY: CPT

## 2023-04-03 ENCOUNTER — DOCUMENTATION ONLY (OUTPATIENT)
Dept: HEMATOLOGY | Age: 68
End: 2023-04-03

## 2023-04-03 NOTE — PROGRESS NOTES
Last office notes and labs faxed to [facility] at [phone]    Fax confirmation received

## 2023-06-06 ENCOUNTER — OFFICE VISIT (OUTPATIENT)
Age: 68
End: 2023-06-06
Payer: MEDICARE

## 2023-06-06 VITALS
DIASTOLIC BLOOD PRESSURE: 96 MMHG | BODY MASS INDEX: 30.21 KG/M2 | OXYGEN SATURATION: 98 % | WEIGHT: 243 LBS | HEART RATE: 80 BPM | HEIGHT: 75 IN | SYSTOLIC BLOOD PRESSURE: 140 MMHG | TEMPERATURE: 97.9 F

## 2023-06-06 DIAGNOSIS — K70.30 ALCOHOLIC CIRRHOSIS OF LIVER WITHOUT ASCITES (HCC): Primary | ICD-10-CM

## 2023-06-06 PROCEDURE — 99214 OFFICE O/P EST MOD 30 MIN: CPT | Performed by: PHYSICIAN ASSISTANT

## 2023-06-06 PROCEDURE — G8417 CALC BMI ABV UP PARAM F/U: HCPCS | Performed by: PHYSICIAN ASSISTANT

## 2023-06-06 PROCEDURE — G8427 DOCREV CUR MEDS BY ELIG CLIN: HCPCS | Performed by: PHYSICIAN ASSISTANT

## 2023-06-06 PROCEDURE — 1036F TOBACCO NON-USER: CPT | Performed by: PHYSICIAN ASSISTANT

## 2023-06-06 PROCEDURE — 3017F COLORECTAL CA SCREEN DOC REV: CPT | Performed by: PHYSICIAN ASSISTANT

## 2023-06-06 PROCEDURE — 1123F ACP DISCUSS/DSCN MKR DOCD: CPT | Performed by: PHYSICIAN ASSISTANT

## 2023-06-06 ASSESSMENT — PATIENT HEALTH QUESTIONNAIRE - PHQ9
SUM OF ALL RESPONSES TO PHQ QUESTIONS 1-9: 0
1. LITTLE INTEREST OR PLEASURE IN DOING THINGS: 0
2. FEELING DOWN, DEPRESSED OR HOPELESS: 0
SUM OF ALL RESPONSES TO PHQ QUESTIONS 1-9: 0
SUM OF ALL RESPONSES TO PHQ QUESTIONS 1-9: 0
SUM OF ALL RESPONSES TO PHQ9 QUESTIONS 1 & 2: 0
SUM OF ALL RESPONSES TO PHQ QUESTIONS 1-9: 0

## 2023-06-06 NOTE — PROGRESS NOTES
Identified pt with two pt identifiers(name and ). Reviewed record in preparation for visit and have obtained necessary documentation. Chief Complaint   Patient presents with    Cirrhosis     Follow up     BP (!) 140/96 (Site: Left Upper Arm, Position: Sitting, Cuff Size: Medium Adult)   Pulse 80   Temp 97.9 °F (36.6 °C) (Temporal)   Ht 6' 3\" (1.905 m)   Wt 243 lb (110.2 kg)   SpO2 98%   BMI 30.37 kg/m²       1. \"Have you been to the ER, urgent care clinic since your last visit? Hospitalized since your last visit? \" No    2. \"Have you seen or consulted any other health care providers outside of the [address] [place] [address] since your last visit? \" No     Patient is accompanied by self I have received verbal consent from Yelena Caraballo to discuss any/all medical information while they are present in the room.
cirrhosis. No liver mass lesions, stable hypoechoic liver cysts. No dilated bile ducts. No ascites. TIPS patent, appropriate velocities. 1/2022. Ultrasound of liver. Pending at the time of office visit. 7/2022. Ultrasound of liver. No apparent hepatic mass. Patent TIPS. 2/2023. Ultrasound of liver. Cirrhotic liver with no hepatic mass. Patent TIPS. No significant ascites. OTHER TESTING:  Not available or performed    ASSESSMENT AND PLAN:  Cirrhosis secondary to alcohol. Last labs show stable but depressed liver function which is markedly improved since cessation of alcohol. He has had varying MELD scores from 14 to as high as 18, this has been largely driven by his elevation in indirect bilirubin, consistent with Gilbert's. I am continuing to watch closely though his INR and tendency to low Na. Will recalculate on the basis of today's labs. Ascites and edema have resolved following placement of TIPS, this is patent on recent US doppler in 2/2023. Patient has discontinued use of diuretics as of 5/2017 and has had no reaccumulation. I will continue to monitor with US every 6 months, next will be done in 8/2023. Esophageal varices without prior bleeding. Varices have been banded. TIPS will treat varices he will not require additional EGD with banding. Follow-up per Dr. Donell Grant. Hepatic encephalopathy has not developed to date. There is no need for treatment with lactulose and/or Xifaxan at this time. No need to restrict dietary protein at this time. Patient is taking OTC Miralax as needed to maintain bowel regularity. The patient was directed to continue all current medications at the current dosages. There are no contraindications for the patient to take any medications that are necessary for treatment of other medical issues. The patient was counseled regarding alcohol consumption. I have congratulated him on attaining >5 years' sobriety.     Vaccination for viral

## 2023-06-07 LAB
AFP L3 MFR SERPL: NORMAL % (ref 0–9.9)
AFP SERPL-MCNC: 5.1 NG/ML (ref 0–8.4)
ALBUMIN SERPL-MCNC: 3.5 G/DL (ref 3.8–4.8)
ALP SERPL-CCNC: 116 IU/L (ref 44–121)
ALT SERPL-CCNC: 40 IU/L (ref 0–44)
AST SERPL-CCNC: 78 IU/L (ref 0–40)
BASOPHILS # BLD AUTO: 0.1 X10E3/UL (ref 0–0.2)
BASOPHILS NFR BLD AUTO: 2 %
BILIRUB DIRECT SERPL-MCNC: 1.5 MG/DL (ref 0–0.4)
BILIRUB SERPL-MCNC: 3.4 MG/DL (ref 0–1.2)
BUN SERPL-MCNC: 5 MG/DL (ref 8–27)
BUN/CREAT SERPL: 6 (ref 10–24)
CALCIUM SERPL-MCNC: 9.1 MG/DL (ref 8.6–10.2)
CHLORIDE SERPL-SCNC: 96 MMOL/L (ref 96–106)
CO2 SERPL-SCNC: 23 MMOL/L (ref 20–29)
CREAT SERPL-MCNC: 0.78 MG/DL (ref 0.76–1.27)
EGFRCR SERPLBLD CKD-EPI 2021: 98 ML/MIN/1.73
EOSINOPHIL # BLD AUTO: 0.1 X10E3/UL (ref 0–0.4)
EOSINOPHIL NFR BLD AUTO: 2 %
ERYTHROCYTE [DISTWIDTH] IN BLOOD BY AUTOMATED COUNT: 13.1 % (ref 11.6–15.4)
GLUCOSE SERPL-MCNC: 108 MG/DL (ref 70–99)
HCT VFR BLD AUTO: 45.5 % (ref 37.5–51)
HGB BLD-MCNC: 17 G/DL (ref 13–17.7)
IMM GRANULOCYTES # BLD AUTO: 0 X10E3/UL (ref 0–0.1)
IMM GRANULOCYTES NFR BLD AUTO: 1 %
INR PPP: 1.4 (ref 0.9–1.2)
LYMPHOCYTES # BLD AUTO: 1.6 X10E3/UL (ref 0.7–3.1)
LYMPHOCYTES NFR BLD AUTO: 30 %
MCH RBC QN AUTO: 35.9 PG (ref 26.6–33)
MCHC RBC AUTO-ENTMCNC: 37.4 G/DL (ref 31.5–35.7)
MCV RBC AUTO: 96 FL (ref 79–97)
MONOCYTES # BLD AUTO: 0.7 X10E3/UL (ref 0.1–0.9)
MONOCYTES NFR BLD AUTO: 13 %
MORPHOLOGY BLD-IMP: ABNORMAL
NEUTROPHILS # BLD AUTO: 2.7 X10E3/UL (ref 1.4–7)
NEUTROPHILS NFR BLD AUTO: 52 %
PLATELET # BLD AUTO: 165 X10E3/UL (ref 150–450)
POTASSIUM SERPL-SCNC: 4.5 MMOL/L (ref 3.5–5.2)
PROT SERPL-MCNC: 6.1 G/DL (ref 6–8.5)
PROTHROMBIN TIME: 14.7 SEC (ref 9.1–12)
RBC # BLD AUTO: 4.74 X10E6/UL (ref 4.14–5.8)
SODIUM SERPL-SCNC: 133 MMOL/L (ref 134–144)
WBC # BLD AUTO: 5.2 X10E3/UL (ref 3.4–10.8)

## 2023-08-21 ENCOUNTER — HOSPITAL ENCOUNTER (OUTPATIENT)
Facility: HOSPITAL | Age: 68
Discharge: HOME | End: 2023-08-24
Payer: MEDICARE

## 2023-08-21 DIAGNOSIS — K70.30 ALCOHOLIC CIRRHOSIS OF LIVER WITHOUT ASCITES (HCC): ICD-10-CM

## 2023-08-21 LAB
VAS TIPS PORTAL VEIN INFLOW PSV: 16.5 CM/S
VAS TIPS STENT HEPATIC END PSV: 97 CM/S
VAS TIPS STENT MID PSV: 101.4 CM/S
VAS TIPS STENT PORTAL END PSV: 63 CM/S

## 2023-08-21 PROCEDURE — 93975 VASCULAR STUDY: CPT

## 2023-08-21 PROCEDURE — 76705 ECHO EXAM OF ABDOMEN: CPT

## 2024-01-04 ENCOUNTER — OFFICE VISIT (OUTPATIENT)
Age: 69
End: 2024-01-04
Payer: MEDICARE

## 2024-01-04 VITALS
WEIGHT: 254 LBS | SYSTOLIC BLOOD PRESSURE: 140 MMHG | HEART RATE: 87 BPM | BODY MASS INDEX: 31.58 KG/M2 | DIASTOLIC BLOOD PRESSURE: 70 MMHG | HEIGHT: 75 IN | TEMPERATURE: 98.2 F | OXYGEN SATURATION: 97 %

## 2024-01-04 DIAGNOSIS — K70.30 ALCOHOLIC CIRRHOSIS OF LIVER WITHOUT ASCITES (HCC): Primary | ICD-10-CM

## 2024-01-04 DIAGNOSIS — Z12.5 ENCOUNTER FOR SCREENING FOR MALIGNANT NEOPLASM OF PROSTATE: ICD-10-CM

## 2024-01-04 LAB
INR PPP: 1.6 (ref 0.9–1.1)
PROTHROMBIN TIME: 16 SEC (ref 9–11.1)

## 2024-01-04 PROCEDURE — G8417 CALC BMI ABV UP PARAM F/U: HCPCS | Performed by: PHYSICIAN ASSISTANT

## 2024-01-04 PROCEDURE — 1123F ACP DISCUSS/DSCN MKR DOCD: CPT | Performed by: PHYSICIAN ASSISTANT

## 2024-01-04 PROCEDURE — 99214 OFFICE O/P EST MOD 30 MIN: CPT | Performed by: PHYSICIAN ASSISTANT

## 2024-01-04 PROCEDURE — G8427 DOCREV CUR MEDS BY ELIG CLIN: HCPCS | Performed by: PHYSICIAN ASSISTANT

## 2024-01-04 PROCEDURE — 3017F COLORECTAL CA SCREEN DOC REV: CPT | Performed by: PHYSICIAN ASSISTANT

## 2024-01-04 PROCEDURE — 1036F TOBACCO NON-USER: CPT | Performed by: PHYSICIAN ASSISTANT

## 2024-01-04 PROCEDURE — G8484 FLU IMMUNIZE NO ADMIN: HCPCS | Performed by: PHYSICIAN ASSISTANT

## 2024-01-04 RX ORDER — APIXABAN 5 MG/1
5 TABLET, FILM COATED ORAL 2 TIMES DAILY
COMMUNITY
Start: 2023-12-16

## 2024-01-04 ASSESSMENT — PATIENT HEALTH QUESTIONNAIRE - PHQ9
2. FEELING DOWN, DEPRESSED OR HOPELESS: 0
1. LITTLE INTEREST OR PLEASURE IN DOING THINGS: 0
SUM OF ALL RESPONSES TO PHQ QUESTIONS 1-9: 0
SUM OF ALL RESPONSES TO PHQ QUESTIONS 1-9: 0
SUM OF ALL RESPONSES TO PHQ9 QUESTIONS 1 & 2: 0
SUM OF ALL RESPONSES TO PHQ QUESTIONS 1-9: 0
SUM OF ALL RESPONSES TO PHQ QUESTIONS 1-9: 0

## 2024-01-04 NOTE — PROGRESS NOTES
Identified pt with two pt identifiers(name and ). Reviewed record in preparation for visit and have obtained necessary documentation.    Chief Complaint   Patient presents with    Cirrhosis     Follow up     BP (!) 140/70 (Site: Right Upper Arm, Position: Sitting, Cuff Size: Large Adult)   Pulse 87   Temp 98.2 °F (36.8 °C)   Ht 1.905 m (6' 3\")   Wt 115.2 kg (254 lb)   SpO2 97%   BMI 31.75 kg/m²       1. \"Have you been to the ER, urgent care clinic since your last visit?  Hospitalized since your last visit?\" No    2. \"Have you seen or consulted any other health care providers outside of the [facility] since your last visit?\" Yes  TIPS and US, PCP      Patient is accompanied by self I have received verbal consent from Jerome Gee to discuss any/all medical information while they are present in the room.      
and Shortness Of Breath    Azithromycin Diarrhea and Palpitations     MEDICATIONS  Current Outpatient Medications   Medication Instructions    Eliquis 5 mg, Oral, 2 TIMES DAILY    losartan (COZAAR) 50 mg, Oral, DAILY    metoprolol tartrate (LOPRESSOR) 25 MG tablet Oral, 2 TIMES DAILY     SYSTEM REVIEW NOT RELATED TO LIVER DISEASE OR REVIEWED ABOVE:  Constitution systems: Negative for fever, chills, weight loss.  Weight gain of ~10#, 20# in a year.  Eyes: Negative for visual changes.  ENT: Negative for sore throat, painful swallowing.   Respiratory: Negative for cough, hemoptysis, SOB.   Cardiology: Negative for chest pain, palpitations. None perceived.   GI:  Negative for constipation or diarrhea.  : Negative for urinary frequency, dysuria, hematuria, nocturia.   Skin: Negative for rash.  Well-healed umbilical scar.  Hematology: Negative for easy bruising, blood clots.    Musculo-skeletal: Negative for back pain, muscle pain, weakness.  Neurologic: Negative for headaches, dizziness, vertigo, memory problems not related to HE.  Psychology: Negative for anxiety, depression.     FAMILY HISTORY:  The father  of head and neck CA.   The mother  of brain cancer.   There is no family history of liver disease.     SOCIAL HISTORY:  The patient is .   The patient has 1 child and 1 grandchild.  The patient has never used tobacco products.   The patient previously consumed 4-6 alcoholic beverages per day.  He states that he has had no alcohol since late 2015.  The patient used to work as an  for [employer] at [employer].  The patient retired in 2015.     PHYSICAL EXAMINATION:  BP (!) 141/100 (Site: Left Upper Arm, Position: Sitting, Cuff Size: Large Adult)   Pulse 87   Temp 98.2 °F (36.8 °C)   Ht 1.905 m (6' 3\")   Wt 115.2 kg (254 lb)   SpO2 97%   BMI 31.75 kg/m²     Repeat /70  General: No acute distress.   Eyes: Sclera anicteric.   ENT: No oral lesions.  Thyroid normal.  Nodes: No

## 2024-01-05 LAB
ALBUMIN SERPL-MCNC: 3 G/DL (ref 3.5–5)
ALBUMIN/GLOB SERPL: 1 (ref 1.1–2.2)
ALP SERPL-CCNC: 135 U/L (ref 45–117)
ALT SERPL-CCNC: 44 U/L (ref 12–78)
ANION GAP SERPL CALC-SCNC: 4 MMOL/L (ref 5–15)
AST SERPL-CCNC: 72 U/L (ref 15–37)
BILIRUB DIRECT SERPL-MCNC: 2.3 MG/DL (ref 0–0.2)
BILIRUB SERPL-MCNC: 5.3 MG/DL (ref 0.2–1)
BUN SERPL-MCNC: 6 MG/DL (ref 6–20)
BUN/CREAT SERPL: 8 (ref 12–20)
CALCIUM SERPL-MCNC: 8.5 MG/DL (ref 8.5–10.1)
CHLORIDE SERPL-SCNC: 98 MMOL/L (ref 97–108)
CO2 SERPL-SCNC: 30 MMOL/L (ref 21–32)
CREAT SERPL-MCNC: 0.78 MG/DL (ref 0.7–1.3)
ERYTHROCYTE [DISTWIDTH] IN BLOOD BY AUTOMATED COUNT: 13.3 % (ref 11.5–14.5)
GLOBULIN SER CALC-MCNC: 3 G/DL (ref 2–4)
GLUCOSE SERPL-MCNC: 116 MG/DL (ref 65–100)
HCT VFR BLD AUTO: 47 % (ref 36.6–50.3)
HGB BLD-MCNC: 16.8 G/DL (ref 12.1–17)
MCH RBC QN AUTO: 35.7 PG (ref 26–34)
MCHC RBC AUTO-ENTMCNC: 35.7 G/DL (ref 30–36.5)
MCV RBC AUTO: 99.8 FL (ref 80–99)
NRBC # BLD: 0 K/UL (ref 0–0.01)
NRBC BLD-RTO: 0 PER 100 WBC
PLATELET # BLD AUTO: 123 K/UL (ref 150–400)
PMV BLD AUTO: 11.2 FL (ref 8.9–12.9)
POTASSIUM SERPL-SCNC: 4.2 MMOL/L (ref 3.5–5.1)
PROT SERPL-MCNC: 6 G/DL (ref 6.4–8.2)
PSA SERPL-MCNC: 8.7 NG/ML (ref 0.01–4)
RBC # BLD AUTO: 4.71 M/UL (ref 4.1–5.7)
SODIUM SERPL-SCNC: 132 MMOL/L (ref 136–145)
WBC # BLD AUTO: 5.5 K/UL (ref 4.1–11.1)

## 2024-01-08 LAB
AFP L3 MFR SERPL: 7.8 % (ref 0–9.9)
AFP SERPL-MCNC: 4.9 NG/ML (ref 0–8.4)

## 2024-01-11 ENCOUNTER — TELEPHONE (OUTPATIENT)
Age: 69
End: 2024-01-11

## 2024-01-11 ENCOUNTER — CLINICAL DOCUMENTATION (OUTPATIENT)
Age: 69
End: 2024-01-11

## 2024-01-11 DIAGNOSIS — K70.30 ALCOHOLIC CIRRHOSIS OF LIVER WITHOUT ASCITES (HCC): Primary | ICD-10-CM

## 2024-01-11 DIAGNOSIS — Z95.828 S/P TIPS (TRANSJUGULAR INTRAHEPATIC PORTOSYSTEMIC SHUNT): ICD-10-CM

## 2024-01-11 DIAGNOSIS — Z95.828 PRESENCE OF OTHER VASCULAR IMPLANTS AND GRAFTS: ICD-10-CM

## 2024-02-01 ENCOUNTER — HOSPITAL ENCOUNTER (OUTPATIENT)
Facility: HOSPITAL | Age: 69
Discharge: HOME | End: 2024-02-01
Payer: MEDICARE

## 2024-02-01 DIAGNOSIS — Z95.828 S/P TIPS (TRANSJUGULAR INTRAHEPATIC PORTOSYSTEMIC SHUNT): ICD-10-CM

## 2024-02-01 DIAGNOSIS — K70.30 ALCOHOLIC CIRRHOSIS OF LIVER WITHOUT ASCITES (HCC): ICD-10-CM

## 2024-02-01 PROCEDURE — 6360000004 HC RX CONTRAST MEDICATION: Performed by: PHYSICIAN ASSISTANT

## 2024-02-01 PROCEDURE — 74183 MRI ABD W/O CNTR FLWD CNTR: CPT

## 2024-02-01 PROCEDURE — A9579 GAD-BASE MR CONTRAST NOS,1ML: HCPCS | Performed by: PHYSICIAN ASSISTANT

## 2024-02-01 PROCEDURE — 2580000003 HC RX 258: Performed by: PHYSICIAN ASSISTANT

## 2024-02-01 RX ORDER — 0.9 % SODIUM CHLORIDE 0.9 %
100 INTRAVENOUS SOLUTION INTRAVENOUS ONCE
Status: COMPLETED | OUTPATIENT
Start: 2024-02-01 | End: 2024-02-01

## 2024-02-01 RX ADMIN — SODIUM CHLORIDE 100 ML: 900 INJECTION, SOLUTION INTRAVENOUS at 12:45

## 2024-02-01 RX ADMIN — GADOTERIDOL 20 ML: 279.3 INJECTION, SOLUTION INTRAVENOUS at 12:45
